# Patient Record
Sex: MALE | Race: BLACK OR AFRICAN AMERICAN | Employment: OTHER | URBAN - METROPOLITAN AREA
[De-identification: names, ages, dates, MRNs, and addresses within clinical notes are randomized per-mention and may not be internally consistent; named-entity substitution may affect disease eponyms.]

---

## 2017-03-16 ENCOUNTER — OFFICE VISIT (OUTPATIENT)
Dept: CARDIOLOGY CLINIC | Age: 82
End: 2017-03-16

## 2017-03-16 VITALS
DIASTOLIC BLOOD PRESSURE: 57 MMHG | BODY MASS INDEX: 27.55 KG/M2 | HEART RATE: 60 BPM | WEIGHT: 186 LBS | SYSTOLIC BLOOD PRESSURE: 103 MMHG | HEIGHT: 69 IN

## 2017-03-16 DIAGNOSIS — I25.119 ATHEROSCLEROSIS OF NATIVE CORONARY ARTERY OF NATIVE HEART WITH ANGINA PECTORIS (HCC): Primary | ICD-10-CM

## 2017-03-16 DIAGNOSIS — I10 ESSENTIAL HYPERTENSION, BENIGN: ICD-10-CM

## 2017-03-16 DIAGNOSIS — Z95.5 S/P CORONARY ARTERY STENT PLACEMENT: ICD-10-CM

## 2017-03-16 DIAGNOSIS — I50.42 CHRONIC COMBINED SYSTOLIC AND DIASTOLIC HEART FAILURE (HCC): ICD-10-CM

## 2017-03-16 NOTE — PROGRESS NOTES
1. Have you been to the ER, urgent care clinic since your last visit? Hospitalized since your last visit? No    2. Have you seen or consulted any other health care providers outside of the [address] [place] [address] since your last visit? Include any pap smears or colon screening. Yes Where: [facility]/PCP     3. Since your last visit, have you had any of the following symptoms? . Swellings leg/arms          4. Have you had any blood work, X-rays or cardiac testing? No              5.  Where do you normally have your labs drawn? [facility]    6. Do you need any refills today?    No

## 2017-03-16 NOTE — LETTER
Giselle Lupe 9/4/1927 
 
3/16/2017 Dear Micheal Merino NP I had the pleasure of evaluating  Mr. Emily Reardon in office today. Below are the relevant portions of my assessment and plan of care. ICD-10-CM ICD-9-CM 1. Atherosclerosis of native coronary artery of native heart with angina pectoris (HCC) I25.119 414.01   
  413.9   
 stable 
chest pains better 2. Chronic combined systolic and diastolic heart failure (HCC) I50.42 428.42   
 stable 
limited activity-has dependent edema 3. Essential hypertension, benign I10 401.1   
 controlled 4. S/P coronary artery stent placement Z95.5 V45.82 Current Outpatient Prescriptions Medication Sig Dispense Refill  AMLODIPINE BESYLATE (NORVASC PO) Take 5 mg by mouth.  gabapentin (NEURONTIN) 300 mg capsule Take 300 mg by mouth daily.  ferrous gluconate 324 mg (38 mg iron) tablet Take  by mouth Daily (before breakfast).  cyanocobalamin (VITAMIN B12) 500 mcg tablet Take 500 mcg by mouth daily.  aspirin (ASPIRIN) 325 mg tablet Take 325 mg by mouth daily.  insulin glargine (LANTUS) 100 unit/mL injection by SubCUTAneous route nightly.  carvedilol (COREG) 12.5 mg tablet Take 1 Tab by mouth two (2) times daily (with meals). 60 Tab 6  tamsulosin (FLOMAX) 0.4 mg capsule Take 1 Cap by mouth daily (after dinner). 90 Cap 3  
 atorvastatin (LIPITOR) 80 mg tablet Take 1 Tab by mouth daily. 30 Tab 6  furosemide (LASIX) 40 mg tablet Take 1 Tab by mouth daily. (Patient taking differently: Take 40 mg by mouth two (2) times a day.) 30 Tab 6  
 lisinopril (PRINIVIL, ZESTRIL) 20 mg tablet Take  by mouth daily.  memantine (NAMENDA) 10 mg tablet Take  by mouth two (2) times a day. No orders of the defined types were placed in this encounter. If you have questions, please do not hesitate to call me. I look forward to following Mr. Emily Reardon along with you. Sincerely, Mendy Quijano MD

## 2017-03-16 NOTE — PROGRESS NOTES
HISTORY OF PRESENT ILLNESS  Keenan Kitchen is a 80 y.o. male. HPI Comments: Patient with cad,old pci,chf,,htn. On follow up patient denies any chest pains, palpitation or other significant symptoms. He has  sob and edema,no change      Cardiomyopathy   The history is provided by the patient. This is a chronic problem. The problem occurs constantly. The problem has not changed since onset. Associated symptoms include shortness of breath. Pertinent negatives include no chest pain, no abdominal pain and no headaches. CHF   The history is provided by the patient. This is a chronic problem. The problem occurs constantly. Associated symptoms include shortness of breath. Pertinent negatives include no chest pain, no abdominal pain and no headaches. The symptoms are aggravated by walking. Hypertension   The history is provided by the patient. This is a chronic problem. The problem occurs constantly. The problem has not changed since onset. Associated symptoms include shortness of breath. Pertinent negatives include no chest pain, no abdominal pain and no headaches. Review of Systems   Constitutional: Negative for chills and fever. HENT: Negative for nosebleeds. Eyes: Negative for blurred vision and double vision. Respiratory: Positive for shortness of breath. Negative for cough, hemoptysis, sputum production and wheezing. Cardiovascular: Positive for leg swelling. Negative for chest pain, palpitations, orthopnea, claudication and PND. Gastrointestinal: Negative for abdominal pain, heartburn, nausea and vomiting. Musculoskeletal: Negative for myalgias. Skin: Negative for rash. Neurological: Negative for dizziness, weakness and headaches. Endo/Heme/Allergies: Does not bruise/bleed easily.      Family History   Problem Relation Age of Onset    Stroke Father     Cancer Sister     Cancer Brother     Heart Attack Neg Hx     Heart Surgery Neg Hx        Past Medical History:   Diagnosis Date  Atrophic testicle     BPH (benign prostatic hyperplasia)     CAD (coronary artery disease)     Diabetes (HCC)     Hematuria, microscopic     Hypertension     Incontinence     Lower urinary tract symptoms (LUTS)     Myocardial infarction ([facility] [address]     rca pci/4/2013    Other and unspecified hyperlipidemia 2/3/2014    f/u per pmd     Prostatitis        Past Surgical History:   Procedure Laterality Date    HX HEART CATHETERIZATION      4/13/3 vessel cad    HX PTCA         Social History   Substance Use Topics    Smoking status: Former Smoker     Types: Cigarettes     Quit date: 3/3/1991    Smokeless tobacco: Never Used    Alcohol use No       Allergies   Allergen Reactions    Augmentin [Amoxicillin-Pot Clavulanate] Diarrhea and Other (comments)     nausea       Current Outpatient Prescriptions   Medication Sig    AMLODIPINE BESYLATE (NORVASC PO) Take 5 mg by mouth.  gabapentin (NEURONTIN) 300 mg capsule Take 300 mg by mouth daily.  ferrous gluconate 324 mg (38 mg iron) tablet Take  by mouth Daily (before breakfast).  cyanocobalamin (VITAMIN B12) 500 mcg tablet Take 500 mcg by mouth daily.  aspirin (ASPIRIN) 325 mg tablet Take 325 mg by mouth daily.  insulin glargine (LANTUS) 100 unit/mL injection by SubCUTAneous route nightly.  carvedilol (COREG) 12.5 mg tablet Take 1 Tab by mouth two (2) times daily (with meals).  tamsulosin (FLOMAX) 0.4 mg capsule Take 1 Cap by mouth daily (after dinner).  atorvastatin (LIPITOR) 80 mg tablet Take 1 Tab by mouth daily.  furosemide (LASIX) 40 mg tablet Take 1 Tab by mouth daily. (Patient taking differently: Take 40 mg by mouth two (2) times a day.)    lisinopril (PRINIVIL, ZESTRIL) 20 mg tablet Take  by mouth daily.  memantine (NAMENDA) 10 mg tablet Take  by mouth two (2) times a day. No current facility-administered medications for this visit.         Visit Vitals    /57    Pulse 60    Ht 5' 9\" (1.753 m)    Wt 84.4 kg (186 lb)    BMI 27.47 kg/m2         Physical Exam   Constitutional: He is oriented to person, place, and time. He appears well-developed and well-nourished. HENT:   Head: Normocephalic and atraumatic. Eyes: Conjunctivae are normal.   Neck: Neck supple. No JVD present. No tracheal deviation present. No thyromegaly present. Cardiovascular: Normal rate, regular rhythm and normal heart sounds. Exam reveals no gallop and no friction rub. No murmur heard. Pulmonary/Chest: Breath sounds normal. No respiratory distress. He has no wheezes. He has no rales. He exhibits no tenderness. Abdominal: Soft. There is no tenderness. Musculoskeletal: He exhibits no edema. Neurological: He is alert and oriented to person, place, and time. Skin: Skin is warm and dry. Psychiatric: He has a normal mood and affect. Mr. Tori Aparicio has a reminder for a \"due or due soon\" health maintenance. I have asked that he contact his primary care provider for follow-up on this health maintenance. ECHO:8/2013  NORMAL LEFT VENTRICULAR CAVITY SIZE AND WALL THICKNESS. MILDLY DECREASED LEFT VENTRICULAR SYSTOLIC FUNCTION WITH EJECTION FRACTION ESTIMATED AT 50%. MILD DIASTOLIC DYSFUNCTION. MILD CALCIFICATION OF THE MITRAL VALVE LEAFLETS WITH TRACE MITRAL REGURGITATION. MILDLY SCLEROTIC TRICUSPID AORTIC VALVE WITHOUT EVIDENCE OF STENOSIS. MILD TRICUSPID REGURGITATION. CATH:Findings/PostOp Diagnosis:4/2013  1. Left ventricular ejection fraction = 45%, no mitral   regurgitation. 2. Normal left ventricular end diastolic pressure = 5 mmHg. 3. Three (3) vessel Coronary Artery Disease:  4. Successful reperfusion of proximal and distal RCA: PTCA/15 mm   Integrity BMS to ostial/proximal RCA 90%/СЕРГЕЙ 2 to 0%/СЕРГЕЙ 3   flow, dilated 3.1 mm diameter and Successful PTCA/22 mm Integrity   BMS to distal RCA into RPLV conn segment 95%/СЕРГЕЙ 2 to 0%/СЕРГЕЙ 3   flow, taper dilated 2.5/2/36 mm diameter.     11/2016-stress test  GATED IMAGES SHOW:        - A LARGE SIZED AREA OF HYPOKINESIS IN THE APEX, APICAL ANTERIOR, BASAL ANTERIOR, APICAL SEPTAL   AND ANTEROSEPTAL SEGMENTS        - A MEDIUM SIZED AREA OF HYPOKINESIS IN THE INFEROSEPTAL AND BASAL INFERIOR SEGMENTS        - A MEDIUM SIZED AREA OF EQUIVOCAL WALL MOTION IN THE BASAL INFEROLATERAL AND   ANTEROLATERAL SEGMENTS   LVEF 36%    Assessment       ICD-10-CM ICD-9-CM    1. Atherosclerosis of native coronary artery of native heart with angina pectoris ([facility]) I25.119 414.01      413.9     stable  chest pains better   2. Chronic combined systolic and diastolic heart failure ([facility]) I50.42 428.42     stable  limited activity-has dependent edema   3. Essential hypertension, benign I10 401.1     controlled   4. S/P coronary artery stent placement Z95.5 V45.82          Medications Discontinued During This Encounter   Medication Reason    metFORMIN (GLUMETZA ER) 500 mg TG24 24 hour tablet Discontinued by Another Clinician       No orders of the defined types were placed in this encounter. Follow-up Disposition:  Return in about 6 months (around 9/16/2017).

## 2017-03-16 NOTE — MR AVS SNAPSHOT
Visit Information Date & Time Provider Department Dept. Phone Encounter #  
 3/16/2017 11:30 AM Philip James MD [facility] [phone] Follow-up Instructions Return in about 6 months (around 9/16/2017). Your Appointments 3/16/2017 11:30 AM  
Follow Up with Philip James MD  
[facility] ([facility]) Appt Note: 3 month follow up  
 [address] [place] [address]  
  
   
 [address] [facility] [address] Upcoming Health Maintenance Date Due HEMOGLOBIN A1C Q6M 9/4/1927 LIPID PANEL Q1 9/4/1927 FOOT EXAM Q1 9/4/1937 MICROALBUMIN Q1 9/4/1937 EYE EXAM RETINAL OR DILATED Q1 9/4/1937 DTaP/Tdap/Td series (1 - Tdap) 9/4/1948 ZOSTER VACCINE AGE 60> 9/4/1987 GLAUCOMA SCREENING Q2Y 9/4/1992 Pneumococcal 65+ Low/Medium Risk (1 of 2 - PCV13) 9/4/1992 MEDICARE YEARLY EXAM 9/4/1992 INFLUENZA AGE 9 TO ADULT 8/1/2016 Allergies as of 3/16/2017  Review Complete On: 3/16/2017 By: Philip James MD  
  
 Severity Noted Reaction Type Reactions Augmentin [Amoxicillin-pot Clavulanate] High 01/06/2015   Systemic Diarrhea, Other (comments)  
 nausea Current Immunizations  Never Reviewed No immunizations on file. Not reviewed this visit You Were Diagnosed With   
  
 Codes Comments Atherosclerosis of native coronary artery of native heart with angina pectoris ([facility] [address]    -  Primary ICD-10-CM: I25.119 ICD-9-CM: 414.01, 413.9 stable 
chest pains better Chronic combined systolic and diastolic heart failure (HCC)     ICD-10-CM: I50.42 
ICD-9-CM: 428.42 stable 
limited activity-has dependent edema Essential hypertension, benign     ICD-10-CM: I10 
ICD-9-CM: 401.1 controlled S/P coronary artery stent placement     ICD-10-CM: Z95.5 ICD-9-CM: V45.82 Vitals BP Pulse Height(growth percentile) Weight(growth percentile) BMI Smoking Status 103/57 60 5' 9\" (1.753 m) 186 lb (84.4 kg) 27.47 kg/m2 Former Smoker Vitals History BMI and BSA Data Body Mass Index Body Surface Area  
 27.47 kg/m 2 2.03 m 2 Preferred Pharmacy Pharmacy Name Phone [id number] [address] Your Updated Medication List  
  
   
This list is accurate as of: 3/16/17 11:28 AM.  Always use your most recent med list.  
  
  
  
  
 aspirin 325 mg tablet Commonly known as:  ASPIRIN Take 325 mg by mouth daily. atorvastatin 80 mg tablet Commonly known as:  LIPITOR Take 1 Tab by mouth daily. carvedilol 12.5 mg tablet Commonly known as:  Lassiter Servant Take 1 Tab by mouth two (2) times daily (with meals). cyanocobalamin 500 mcg tablet Commonly known as:  VITAMIN B12 Take 500 mcg by mouth daily. ferrous gluconate 324 mg (38 mg iron) tablet Take  by mouth Daily (before breakfast). furosemide 40 mg tablet Commonly known as:  LASIX Take 1 Tab by mouth daily. gabapentin 300 mg capsule Commonly known as:  NEURONTIN Take 300 mg by mouth daily. LANTUS 100 unit/mL injection Generic drug:  insulin glargine  
by SubCUTAneous route nightly. lisinopril 20 mg tablet Commonly known as:  Rene Shutters Take  by mouth daily. NAMENDA 10 mg tablet Generic drug:  memantine Take  by mouth two (2) times a day. NORVASC PO Take 5 mg by mouth. tamsulosin 0.4 mg capsule Commonly known as:  FLOMAX Take 1 Cap by mouth daily (after dinner). Follow-up Instructions Return in about 6 months (around 9/16/2017). Introducing [facility]! Mady Verdugo introduces [employer] patient portal. Now you can access parts of your medical record, email your doctor's office, and request medication refills online. 1. In your internet browser, go to [email or web address] 2. Click on the First Time User? Click Here link in the Sign In box. You will see the New Member Sign Up page. 3. Enter your [employer] Access Code exactly as it appears below. You will not need to use this code after youve completed the sign-up process. If you do not sign up before the expiration date, you must request a new code. · [employer] Access Code: [email or web address] Expires: 6/14/2017 10:58 AM 
 
4. Enter the last four digits of your Social Security Number (xxxx) and Date of Birth (mm/dd/yyyy) as indicated and click Submit. You will be taken to the next sign-up page. 5. Create a [employer] ID. This will be your [employer] login ID and cannot be changed, so think of one that is secure and easy to remember. 6. Create a [employer] password. You can change your password at any time. 7. Enter your Password Reset Question and Answer. This can be used at a later time if you forget your password. 8. Enter your e-mail address. You will receive e-mail notification when new information is available in [address] 9. Click Sign Up. You can now view and download portions of your medical record. 10. Click the Download Summary menu link to download a portable copy of your medical information. If you have questions, please visit the Frequently Asked Questions section of the [employer] website. Remember, [employer] is NOT to be used for urgent needs. For medical emergencies, dial 911. Now available from your iPhone and Android! Please provide this summary of care documentation to your next provider. Your primary care clinician is listed as Mark Arriola. If you have any questions after today's visit, please call [phone].

## 2017-10-24 ENCOUNTER — OFFICE VISIT (OUTPATIENT)
Dept: CARDIOLOGY CLINIC | Age: 82
End: 2017-10-24

## 2017-10-24 VITALS
WEIGHT: 197 LBS | SYSTOLIC BLOOD PRESSURE: 125 MMHG | HEIGHT: 69 IN | DIASTOLIC BLOOD PRESSURE: 62 MMHG | BODY MASS INDEX: 29.18 KG/M2 | HEART RATE: 66 BPM

## 2017-10-24 DIAGNOSIS — Z95.5 S/P CORONARY ARTERY STENT PLACEMENT: ICD-10-CM

## 2017-10-24 DIAGNOSIS — I10 ESSENTIAL HYPERTENSION: ICD-10-CM

## 2017-10-24 DIAGNOSIS — I50.42 CHRONIC COMBINED SYSTOLIC AND DIASTOLIC HEART FAILURE (HCC): ICD-10-CM

## 2017-10-24 DIAGNOSIS — I42.9 CARDIOMYOPATHY, UNSPECIFIED TYPE (HCC): ICD-10-CM

## 2017-10-24 DIAGNOSIS — I25.119 CORONARY ARTERY DISEASE INVOLVING NATIVE CORONARY ARTERY OF NATIVE HEART WITH ANGINA PECTORIS (HCC): Primary | ICD-10-CM

## 2017-10-24 RX ORDER — ALLOPURINOL 100 MG/1
TABLET ORAL 2 TIMES DAILY
COMMUNITY

## 2017-10-24 NOTE — PROGRESS NOTES
1. Have you been to the ER, urgent care clinic since your last visit? Hospitalized since your last visit?     no    2. Have you seen or consulted any other health care providers outside of the [address] [place] [address] since your last visit? Include any pap smears or colon screening. Yes Where: pcp     3. Since your last visit, have you had any of the following symptoms?  no

## 2017-10-24 NOTE — LETTER
Gregory Szymanski 9/4/1927 
 
10/24/2017 Dear Pepe Bishop, ARLET I had the pleasure of evaluating  Mr. Rufino Schilling in office today. Below are the relevant portions of my assessment and plan of care. ICD-10-CM ICD-9-CM 1. Coronary artery disease involving native coronary artery of native heart with angina pectoris (HCC) I25.119 414.01   
  413.9   
 stable 
exertional sob 2. Essential hypertension I10 401.9   
 controlled 3. Chronic combined systolic and diastolic heart failure (HCC) I50.42 428.42   
 stable 
limited activity 4. [facility] I42.9 425.4 5. S/P coronary artery stent placement Z95.5 V45.82   
 stable Current Outpatient Prescriptions Medication Sig Dispense Refill  allopurinol (ZYLOPRIM) 100 mg tablet Take  by mouth two (2) times a day.  AMLODIPINE BESYLATE (NORVASC PO) Take 5 mg by mouth.  gabapentin (NEURONTIN) 300 mg capsule Take 300 mg by mouth daily.  ferrous gluconate 324 mg (38 mg iron) tablet Take  by mouth Daily (before breakfast).  cyanocobalamin (VITAMIN B12) 500 mcg tablet Take 500 mcg by mouth daily.  aspirin (ASPIRIN) 325 mg tablet Take 325 mg by mouth daily.  insulin glargine (LANTUS) 100 unit/mL injection by SubCUTAneous route nightly.  carvedilol (COREG) 12.5 mg tablet Take 1 Tab by mouth two (2) times daily (with meals). 60 Tab 6  tamsulosin (FLOMAX) 0.4 mg capsule Take 1 Cap by mouth daily (after dinner). 90 Cap 3  
 atorvastatin (LIPITOR) 80 mg tablet Take 1 Tab by mouth daily. 30 Tab 6  furosemide (LASIX) 40 mg tablet Take 1 Tab by mouth daily. (Patient taking differently: Take 40 mg by mouth two (2) times a day.) 30 Tab 6  
 lisinopril (PRINIVIL, ZESTRIL) 20 mg tablet Take  by mouth daily.  memantine (NAMENDA) 10 mg tablet Take  by mouth two (2) times a day. Orders Placed This Encounter  allopurinol (ZYLOPRIM) 100 mg tablet Sig: Take  by mouth two (2) times a day. If you have questions, please do not hesitate to call me. I look forward to following Mr. Ashley Nixon along with you. Sincerely, Ambreen Arredondo MD

## 2017-10-24 NOTE — PROGRESS NOTES
HISTORY OF PRESENT ILLNESS  Mago Carmona is a 80 y.o. male. HPI Comments: Patient with cad,old pci,chf,,htn. On follow up patient denies any chest pains, palpitation or other significant symptoms. He has  sob and edema,no change      Cardiomyopathy   The history is provided by the patient. This is a chronic problem. The problem occurs constantly. The problem has not changed since onset. Associated symptoms include shortness of breath. Pertinent negatives include no chest pain, no abdominal pain and no headaches. CHF   The history is provided by the patient. This is a chronic problem. The problem occurs constantly. Associated symptoms include shortness of breath. Pertinent negatives include no chest pain, no abdominal pain and no headaches. The symptoms are aggravated by walking. Hypertension   The history is provided by the patient. This is a chronic problem. The problem occurs constantly. The problem has not changed since onset. Associated symptoms include shortness of breath. Pertinent negatives include no chest pain, no abdominal pain and no headaches. Review of Systems   Constitutional: Negative for chills and fever. HENT: Negative for nosebleeds. Eyes: Negative for blurred vision and double vision. Respiratory: Positive for shortness of breath. Negative for cough, hemoptysis, sputum production and wheezing. Cardiovascular: Positive for leg swelling. Negative for chest pain, palpitations, orthopnea, claudication and PND. Gastrointestinal: Negative for abdominal pain, heartburn, nausea and vomiting. Musculoskeletal: Negative for myalgias. Skin: Negative for rash. Neurological: Negative for dizziness, weakness and headaches. Endo/Heme/Allergies: Does not bruise/bleed easily.      Family History   Problem Relation Age of Onset    Stroke Father     Cancer Sister     Cancer Brother     Heart Attack Neg Hx     Heart Surgery Neg Hx        Past Medical History:   Diagnosis Date  Atrophic testicle     BPH (benign prostatic hyperplasia)     CAD (coronary artery disease)     Diabetes (HCC)     Hematuria, microscopic     Hypertension     Incontinence     Lower urinary tract symptoms (LUTS)     Myocardial infarction     rca pci/4/2013    Other and unspecified hyperlipidemia 2/3/2014    f/u per pmd     Prostatitis        Past Surgical History:   Procedure Laterality Date    HX HEART CATHETERIZATION      4/13/3 vessel cad    HX PTCA         Social History   Substance Use Topics    Smoking status: Former Smoker     Types: Cigarettes     Quit date: 3/3/1991    Smokeless tobacco: Never Used    Alcohol use No       Allergies   Allergen Reactions    Augmentin [Amoxicillin-Pot Clavulanate] Diarrhea and Other (comments)     nausea       Current Outpatient Prescriptions   Medication Sig    allopurinol (ZYLOPRIM) 100 mg tablet Take  by mouth two (2) times a day.  AMLODIPINE BESYLATE (NORVASC PO) Take 5 mg by mouth.  gabapentin (NEURONTIN) 300 mg capsule Take 300 mg by mouth daily.  ferrous gluconate 324 mg (38 mg iron) tablet Take  by mouth Daily (before breakfast).  cyanocobalamin (VITAMIN B12) 500 mcg tablet Take 500 mcg by mouth daily.  aspirin (ASPIRIN) 325 mg tablet Take 325 mg by mouth daily.  insulin glargine (LANTUS) 100 unit/mL injection by SubCUTAneous route nightly.  carvedilol (COREG) 12.5 mg tablet Take 1 Tab by mouth two (2) times daily (with meals).  tamsulosin (FLOMAX) 0.4 mg capsule Take 1 Cap by mouth daily (after dinner).  atorvastatin (LIPITOR) 80 mg tablet Take 1 Tab by mouth daily.  furosemide (LASIX) 40 mg tablet Take 1 Tab by mouth daily. (Patient taking differently: Take 40 mg by mouth two (2) times a day.)    lisinopril (PRINIVIL, ZESTRIL) 20 mg tablet Take  by mouth daily.  memantine (NAMENDA) 10 mg tablet Take  by mouth two (2) times a day. No current facility-administered medications for this visit.         Visit Vitals  /62    Pulse 66    Ht 5' 9\" (1.753 m)    Wt 89.4 kg (197 lb)    BMI 29.09 kg/m2         Physical Exam   Constitutional: He is oriented to person, place, and time. He appears well-developed and well-nourished. HENT:   Head: Normocephalic and atraumatic. Eyes: Conjunctivae are normal.   Neck: Neck supple. No JVD present. No tracheal deviation present. No thyromegaly present. Cardiovascular: Normal rate, regular rhythm and normal heart sounds. Exam reveals no gallop and no friction rub. No murmur heard. Pulmonary/Chest: Breath sounds normal. No respiratory distress. He has no wheezes. He has no rales. He exhibits no tenderness. Abdominal: Soft. There is no tenderness. Musculoskeletal: He exhibits no edema. Neurological: He is alert and oriented to person, place, and time. Skin: Skin is warm and dry. Psychiatric: He has a normal mood and affect. Mr. Vinod Torres has a reminder for a \"due or due soon\" health maintenance. I have asked that he contact his primary care provider for follow-up on this health maintenance. ECHO:8/2013  NORMAL LEFT VENTRICULAR CAVITY SIZE AND WALL THICKNESS. MILDLY DECREASED LEFT VENTRICULAR SYSTOLIC FUNCTION WITH EJECTION FRACTION ESTIMATED AT 50%. MILD DIASTOLIC DYSFUNCTION. MILD CALCIFICATION OF THE MITRAL VALVE LEAFLETS WITH TRACE MITRAL REGURGITATION. MILDLY SCLEROTIC TRICUSPID AORTIC VALVE WITHOUT EVIDENCE OF STENOSIS. MILD TRICUSPID REGURGITATION. CATH:Findings/PostOp Diagnosis:4/2013  1. Left ventricular ejection fraction = 45%, no mitral   regurgitation. 2. Normal left ventricular end diastolic pressure = 5 mmHg. 3. Three (3) vessel Coronary Artery Disease:  4.  Successful reperfusion of proximal and distal RCA: PTCA/15 mm   Integrity BMS to ostial/proximal RCA 90%/СЕРГЕЙ 2 to 0%/СЕРГЕЙ 3   flow, dilated 3.1 mm diameter and Successful PTCA/22 mm Integrity   BMS to distal RCA into RPLV conn segment 95%/СЕРГЕЙ 2 to 0%/СЕРГЕЙ 3   flow, taper dilated 2.5/2/36 mm diameter. 11/2016-stress test  GATED IMAGES SHOW:        - A LARGE SIZED AREA OF HYPOKINESIS IN THE APEX, APICAL ANTERIOR, BASAL ANTERIOR, APICAL SEPTAL   AND ANTEROSEPTAL SEGMENTS        - A MEDIUM SIZED AREA OF HYPOKINESIS IN THE INFEROSEPTAL AND BASAL INFERIOR SEGMENTS        - A MEDIUM SIZED AREA OF EQUIVOCAL WALL MOTION IN THE BASAL INFEROLATERAL AND   ANTEROLATERAL SEGMENTS   LVEF 36%    Assessment       ICD-10-CM ICD-9-CM    1. Coronary artery disease involving native coronary artery of native heart with angina pectoris ([facility] I25.119 414.01      413.9     stable  exertional sob   2. Essential hypertension I10 401.9     controlled   3. Chronic combined systolic and diastolic heart failure (HCC) I50.42 428.42     stable  limited activity   4. Cardiomyopathy, unspecified type ([address] I42.9 425.4    5. S/P coronary artery stent placement Z95.5 V45.82     stable         There are no discontinued medications. No orders of the defined types were placed in this encounter. Follow-up Disposition:  Return in about 6 months (around 4/24/2018).

## 2017-10-24 NOTE — MR AVS SNAPSHOT
Visit Information Date & Time Provider Department Dept. Phone Encounter #  
 10/24/2017  9:45 AM Madelyn Lopez MD [facility] [place] [id number] Follow-up Instructions Return in about 6 months (around 4/24/2018). Upcoming Health Maintenance Date Due HEMOGLOBIN A1C Q6M 9/4/1927 LIPID PANEL Q1 9/4/1927 FOOT EXAM Q1 9/4/1937 MICROALBUMIN Q1 9/4/1937 EYE EXAM RETINAL OR DILATED Q1 9/4/1937 DTaP/Tdap/Td series (1 - Tdap) 9/4/1948 ZOSTER VACCINE AGE 60> 7/4/1987 GLAUCOMA SCREENING Q2Y 9/4/1992 Pneumococcal 65+ Low/Medium Risk (1 of 2 - PCV13) 9/4/1992 MEDICARE YEARLY EXAM 9/4/1992 INFLUENZA AGE 9 TO ADULT 8/1/2017 Allergies as of 10/24/2017  Review Complete On: 10/24/2017 By: Madelyn Lopez MD  
  
 Severity Noted Reaction Type Reactions Augmentin [Amoxicillin-pot Clavulanate] High 01/06/2015   Systemic Diarrhea, Other (comments)  
 nausea Current Immunizations  Never Reviewed No immunizations on file. Not reviewed this visit You Were Diagnosed With   
  
 Codes Comments Coronary artery disease involving native coronary artery of native heart with angina pectoris ([facility]    -  Primary ICD-10-CM: I25.119 ICD-9-CM: 414.01, 413.9 stable 
exertional sob Essential hypertension     ICD-10-CM: I10 
ICD-9-CM: 401.9 controlled Chronic combined systolic and diastolic heart failure (HCC)     ICD-10-CM: I50.42 
ICD-9-CM: 428.42 stable 
limited activity Cardiomyopathy, unspecified type ([facility]     ICD-10-CM: I42.9 ICD-9-CM: 425.4 S/P coronary artery stent placement     ICD-10-CM: Z95.5 ICD-9-CM: V45.82 stable Vitals BP Pulse Height(growth percentile) Weight(growth percentile) BMI Smoking Status 125/62 66 5' 9\" (1.753 m) 197 lb (89.4 kg) 29.09 kg/m2 Former Smoker Vitals History BMI and BSA Data Body Mass Index Body Surface Area  
 29.09 kg/m 2 2.09 m 2 Preferred Pharmacy Pharmacy Name Phone [facility] #[id number] [address] Your Updated Medication List  
  
   
This list is accurate as of: 10/24/17  9:45 AM.  Always use your most recent med list.  
  
  
  
  
 allopurinol 100 mg tablet Commonly known as:  Bernett Haily Take  by mouth two (2) times a day. aspirin 325 mg tablet Commonly known as:  ASPIRIN Take 325 mg by mouth daily. atorvastatin 80 mg tablet Commonly known as:  LIPITOR Take 1 Tab by mouth daily. carvedilol 12.5 mg tablet Commonly known as:  Vini Liter Take 1 Tab by mouth two (2) times daily (with meals). cyanocobalamin 500 mcg tablet Commonly known as:  VITAMIN B12 Take 500 mcg by mouth daily. ferrous gluconate 324 mg (38 mg iron) tablet Take  by mouth Daily (before breakfast). furosemide 40 mg tablet Commonly known as:  LASIX Take 1 Tab by mouth daily. gabapentin 300 mg capsule Commonly known as:  NEURONTIN Take 300 mg by mouth daily. LANTUS 100 unit/mL injection Generic drug:  insulin glargine  
by SubCUTAneous route nightly. lisinopril 20 mg tablet Commonly known as:  Merilynn Trenton Take  by mouth daily. NAMENDA 10 mg tablet Generic drug:  memantine Take  by mouth two (2) times a day. NORVASC PO Take 5 mg by mouth. tamsulosin 0.4 mg capsule Commonly known as:  FLOMAX Take 1 Cap by mouth daily (after dinner). Follow-up Instructions Return in about 6 months (around 4/24/2018). Introducing [facility] HE[facility] SERVICES! Feliz Schilling introduces [employer] patient portal. Now you can access parts of your medical record, email your doctor's office, and request medication refills online. 1. In your internet browser, go to [email or web address] 2. Click on the First Time User? Click Here link in the Sign In box. You will see the New Member Sign Up page. 3. Enter your ContextPlane Access Code exactly as it appears below. You will not need to use this code after youve completed the sign-up process. If you do not sign up before the expiration date, you must request a new code. · ContextPlane Access Code: [email or web address] Expires: 1/22/2018  9:45 AM 
 
4. Enter the last four digits of your Social Security Number (xxxx) and Date of Birth (mm/dd/yyyy) as indicated and click Submit. You will be taken to the next sign-up page. 5. Create a ContextPlane ID. This will be your ContextPlane login ID and cannot be changed, so think of one that is secure and easy to remember. 6. Create a ContextPlane password. You can change your password at any time. 7. Enter your Password Reset Question and Answer. This can be used at a later time if you forget your password. 8. Enter your e-mail address. You will receive e-mail notification when new information is available in [address] 9. Click Sign Up. You can now view and download portions of your medical record. 10. Click the Download Summary menu link to download a portable copy of your medical information. If you have questions, please visit the Frequently Asked Questions section of the ContextPlane website. Remember, ContextPlane is NOT to be used for urgent needs. For medical emergencies, dial 911. Now available from your iPhone and Android! Please provide this summary of care documentation to your next provider. Your primary care clinician is listed as Antelmo Saunders. If you have any questions after today's visit, please call [phone].

## 2018-04-25 ENCOUNTER — OFFICE VISIT (OUTPATIENT)
Dept: CARDIOLOGY CLINIC | Age: 83
End: 2018-04-25

## 2018-04-25 VITALS
WEIGHT: 206 LBS | BODY MASS INDEX: 30.51 KG/M2 | HEIGHT: 69 IN | DIASTOLIC BLOOD PRESSURE: 74 MMHG | SYSTOLIC BLOOD PRESSURE: 153 MMHG | HEART RATE: 64 BPM

## 2018-04-25 DIAGNOSIS — E78.5 HYPERLIPIDEMIA, UNSPECIFIED HYPERLIPIDEMIA TYPE: ICD-10-CM

## 2018-04-25 DIAGNOSIS — Z95.5 S/P CORONARY ARTERY STENT PLACEMENT: ICD-10-CM

## 2018-04-25 DIAGNOSIS — I10 ESSENTIAL HYPERTENSION: ICD-10-CM

## 2018-04-25 DIAGNOSIS — I25.119 CORONARY ARTERY DISEASE INVOLVING NATIVE CORONARY ARTERY OF NATIVE HEART WITH ANGINA PECTORIS (HCC): Primary | ICD-10-CM

## 2018-04-25 DIAGNOSIS — I50.42 CHRONIC COMBINED SYSTOLIC AND DIASTOLIC HEART FAILURE (HCC): ICD-10-CM

## 2018-04-25 RX ORDER — PHENOL/SODIUM PHENOLATE
20 AEROSOL, SPRAY (ML) MUCOUS MEMBRANE DAILY
COMMUNITY

## 2018-04-25 RX ORDER — ACETAMINOPHEN 500 MG
TABLET ORAL
COMMUNITY

## 2018-04-25 NOTE — MR AVS SNAPSHOT
[address] [facility] 
 
 
 [address] [facility] 
[phone] Patient: Fiona Babb MRN: [id number] OZU:[id number] Visit Information Date & Time Provider Department Dept. Phone Encounter #  
 4/25/2018 10:15 AM Orin Benitez MD [facility] [place] [phone] Follow-up Instructions Return in about 6 months (around 10/25/2018). Your Appointments 5/10/2018  9:45 AM  
Any with Marisel Richardson MD  
[facility] Appt Note: Hosp F/U Cysto Per Dr. Weaver [facility] [address] [facility] [address] [place] [address]  
  
   
 [address] [facility] [address] [address] [address] Upcoming Health Maintenance Date Due HEMOGLOBIN A1C Q6M 9/4/1927 LIPID PANEL Q1 9/4/1927 FOOT EXAM Q1 9/4/1937 MICROALBUMIN Q1 9/4/1937 EYE EXAM RETINAL OR DILATED Q1 9/4/1937 DTaP/Tdap/Td series (1 - Tdap) 9/4/1948 ZOSTER VACCINE AGE 60> 7/4/1987 GLAUCOMA SCREENING Q2Y 9/4/1992 Pneumococcal 65+ Low/Medium Risk (1 of 2 - PCV13) 9/4/1992 Influenza Age 5 to Adult 8/1/2017 MEDICARE YEARLY EXAM 3/14/2018 Allergies as of 4/25/2018  Review Complete On: 4/25/2018 By: Orin Benitez MD  
  
 Severity Noted Reaction Type Reactions Augmentin [Amoxicillin-pot Clavulanate] High 01/06/2015   Systemic Diarrhea, Other (comments)  
 nausea Current Immunizations  Never Reviewed No immunizations on file. Not reviewed this visit You Were Diagnosed With   
  
 Codes Comments Coronary artery disease involving native coronary artery of native heart with angina pectoris ([facility]    -  Primary ICD-10-CM: I25.119 ICD-9-CM: 414.01, 413.9 stable Essential hypertension     ICD-10-CM: I10 
ICD-9-CM: 401.9 mildly elevated 
monitor Hyperlipidemia, unspecified hyperlipidemia type     ICD-10-CM: E78.5 ICD-9-CM: 272.4 on statin 
lab with pcp Chronic combined systolic and diastolic heart failure (HCC)     ICD-10-CM: I50.42 
ICD-9-CM: 428.42 stable 
limited activity S/P coronary artery stent placement     ICD-10-CM: Z95.5 ICD-9-CM: V45.82 Vitals BP Pulse Height(growth percentile) Weight(growth percentile) BMI Smoking Status 153/74 64 5' 9\" (1.753 m) 206 lb (93.4 kg) 30.42 kg/m2 Former Smoker Vitals History BMI and BSA Data Body Mass Index Body Surface Area  
 30.42 kg/m 2 2.13 m 2 Preferred Pharmacy Pharmacy Name Phone [id number] [address] Your Updated Medication List  
  
   
This list is accurate as of 4/25/18 10:19 AM.  Always use your most recent med list.  
  
  
  
  
 acetaminophen 500 mg tablet Commonly known as:  TYLENOL Take  by mouth every six (6) hours as needed for Pain. allopurinol 100 mg tablet Commonly known as:  Marny Ximena Take  by mouth two (2) times a day. aspirin 325 mg tablet Commonly known as:  ASPIRIN Take 325 mg by mouth daily. atorvastatin 80 mg tablet Commonly known as:  LIPITOR Take 1 Tab by mouth daily. carvedilol 12.5 mg tablet Commonly known as:  [place] Pennant Take 1 Tab by mouth two (2) times daily (with meals). cyanocobalamin 500 mcg tablet Commonly known as:  VITAMIN B12 Take 500 mcg by mouth daily. ferrous gluconate 324 mg (38 mg iron) tablet Take  by mouth Daily (before breakfast). furosemide 40 mg tablet Commonly known as:  LASIX Take 1 Tab by mouth daily. gabapentin 300 mg capsule Commonly known as:  NEURONTIN Take 300 mg by mouth daily. LANTUS U-100 INSULIN 100 unit/mL injection Generic drug:  insulin glargine  
by SubCUTAneous route nightly. NAMENDA 10 mg tablet Generic drug:  memantine Take  by mouth two (2) times a day. NORVASC PO Take 5 mg by mouth. Omeprazole delayed release 20 mg tablet Commonly known as:  PRILOSEC D/R Take 20 mg by mouth daily. Follow-up Instructions Return in about 6 months (around 10/25/2018). Introducing [facility] & [facility] SERVICES! [employer] introduces [employer] patient portal. Now you can access parts of your medical record, email your doctor's office, and request medication refills online. 1. In your internet browser, go to [email or web address] 2. Click on the First Time User? Click Here link in the Sign In box. You will see the New Member Sign Up page. 3. Enter your CloudBase3 Access Code exactly as it appears below. You will not need to use this code after youve completed the sign-up process. If you do not sign up before the expiration date, you must request a new code. · CloudBase3 Access Code: [email or web address] Expires: 7/24/2018  9:38 AM 
 
4. Enter the last four digits of your Social Security Number (xxxx) and Date of Birth (mm/dd/yyyy) as indicated and click Submit. You will be taken to the next sign-up page. 5. Create a CloudBase3 ID. This will be your CloudBase3 login ID and cannot be changed, so think of one that is secure and easy to remember. 6. Create a CloudBase3 password. You can change your password at any time. 7. Enter your Password Reset Question and Answer. This can be used at a later time if you forget your password. 8. Enter your e-mail address. You will receive e-mail notification when new information is available in [address] 9. Click Sign Up. You can now view and download portions of your medical record. 10. Click the Download Summary menu link to download a portable copy of your medical information. If you have questions, please visit the Frequently Asked Questions section of the CloudBase3 website. Remember, CloudBase3 is NOT to be used for urgent needs. For medical emergencies, dial 911. Now available from your iPhone and Android! Please provide this summary of care documentation to your next provider. Your primary care clinician is listed as Marva Pham. If you have any questions after today's visit, please call [phone].

## 2018-04-25 NOTE — PROGRESS NOTES
HISTORY OF PRESENT ILLNESS  Maury Charlton is a 80 y.o. male. HPI Comments: Patient with cad,old pci,chf,,htn. On follow up patient denies any chest pains, palpitation or other significant symptoms. He has  sob and edema,no change      Cardiomyopathy   The history is provided by the patient. This is a chronic problem. The problem occurs constantly. The problem has not changed since onset. Associated symptoms include shortness of breath. Pertinent negatives include no chest pain, no abdominal pain and no headaches. CHF   The history is provided by the patient. This is a chronic problem. The problem occurs constantly. Associated symptoms include shortness of breath. Pertinent negatives include no chest pain, no abdominal pain and no headaches. The symptoms are aggravated by walking. Hypertension   The history is provided by the patient. This is a chronic problem. The problem occurs constantly. The problem has not changed since onset. Associated symptoms include shortness of breath. Pertinent negatives include no chest pain, no abdominal pain and no headaches. Review of Systems   Constitutional: Negative for chills and fever. HENT: Negative for nosebleeds. Eyes: Negative for blurred vision and double vision. Respiratory: Positive for shortness of breath. Negative for cough, hemoptysis, sputum production and wheezing. Cardiovascular: Positive for leg swelling. Negative for chest pain, palpitations, orthopnea, claudication and PND. Gastrointestinal: Negative for abdominal pain, heartburn, nausea and vomiting. Musculoskeletal: Negative for myalgias. Skin: Negative for rash. Neurological: Negative for dizziness, weakness and headaches. Endo/Heme/Allergies: Does not bruise/bleed easily.      Family History   Problem Relation Age of Onset    Stroke Father     Cancer Sister     Cancer Brother     Heart Attack Neg Hx     Heart Surgery Neg Hx        Past Medical History:   Diagnosis Date  Atrophic testicle     BPH (benign prostatic hyperplasia)     CAD (coronary artery disease)     Diabetes (HCC)     Hematuria, microscopic     Hypertension     Incontinence     Lower urinary tract symptoms (LUTS)     Myocardial infarction ([facility] [address]     rca pci/4/2013    Other and unspecified hyperlipidemia 2/3/2014    f/u per pmd     Prostatitis        Past Surgical History:   Procedure Laterality Date    HX HEART CATHETERIZATION      4/13/3 vessel cad    HX PTCA         Social History   Substance Use Topics    Smoking status: Former Smoker     Types: Cigarettes     Quit date: 3/3/1991    Smokeless tobacco: Never Used    Alcohol use No       Allergies   Allergen Reactions    Augmentin [Amoxicillin-Pot Clavulanate] Diarrhea and Other (comments)     nausea       Current Outpatient Prescriptions   Medication Sig    Omeprazole delayed release (PRILOSEC D/R) 20 mg tablet Take 20 mg by mouth daily.  acetaminophen (TYLENOL) 500 mg tablet Take  by mouth every six (6) hours as needed for Pain.  allopurinol (ZYLOPRIM) 100 mg tablet Take  by mouth two (2) times a day.  AMLODIPINE BESYLATE (NORVASC PO) Take 5 mg by mouth.  gabapentin (NEURONTIN) 300 mg capsule Take 300 mg by mouth daily.  ferrous gluconate 324 mg (38 mg iron) tablet Take  by mouth Daily (before breakfast).  cyanocobalamin (VITAMIN B12) 500 mcg tablet Take 500 mcg by mouth daily.  aspirin (ASPIRIN) 325 mg tablet Take 325 mg by mouth daily.  insulin glargine (LANTUS) 100 unit/mL injection by SubCUTAneous route nightly.  carvedilol (COREG) 12.5 mg tablet Take 1 Tab by mouth two (2) times daily (with meals).  atorvastatin (LIPITOR) 80 mg tablet Take 1 Tab by mouth daily.  furosemide (LASIX) 40 mg tablet Take 1 Tab by mouth daily. (Patient taking differently: Take 40 mg by mouth. 1/2 tab every 2 days)    memantine (NAMENDA) 10 mg tablet Take  by mouth two (2) times a day.      No current facility-administered medications for this visit. Visit Vitals    /74    Pulse 64    Ht 5' 9\" (1.753 m)    Wt 93.4 kg (206 lb)    BMI 30.42 kg/m2         Physical Exam   Constitutional: He is oriented to person, place, and time. He appears well-developed and well-nourished. HENT:   Head: Normocephalic and atraumatic. Eyes: Conjunctivae are normal.   Neck: Neck supple. No JVD present. No tracheal deviation present. No thyromegaly present. Cardiovascular: Normal rate, regular rhythm and normal heart sounds. Exam reveals no gallop and no friction rub. No murmur heard. Pulmonary/Chest: Breath sounds normal. No respiratory distress. He has no wheezes. He has no rales. He exhibits no tenderness. Abdominal: Soft. There is no tenderness. Musculoskeletal: He exhibits no edema. Neurological: He is alert and oriented to person, place, and time. Skin: Skin is warm and dry. Psychiatric: He has a normal mood and affect. Mr. Yocasta Antunez has a reminder for a \"due or due soon\" health maintenance. I have asked that he contact his primary care provider for follow-up on this health maintenance. ECHO:8/2013  NORMAL LEFT VENTRICULAR CAVITY SIZE AND WALL THICKNESS. MILDLY DECREASED LEFT VENTRICULAR SYSTOLIC FUNCTION WITH EJECTION FRACTION ESTIMATED AT 50%. MILD DIASTOLIC DYSFUNCTION. MILD CALCIFICATION OF THE MITRAL VALVE LEAFLETS WITH TRACE MITRAL REGURGITATION. MILDLY SCLEROTIC TRICUSPID AORTIC VALVE WITHOUT EVIDENCE OF STENOSIS. MILD TRICUSPID REGURGITATION. CATH:Findings/PostOp Diagnosis:4/2013  1. Left ventricular ejection fraction = 45%, no mitral   regurgitation. 2. Normal left ventricular end diastolic pressure = 5 mmHg. 3. Three (3) vessel Coronary Artery Disease:  4.  Successful reperfusion of proximal and distal RCA: PTCA/15 mm   Integrity BMS to ostial/proximal RCA 90%/СЕРГЕЙ 2 to 0%/СЕРГЕЙ 3   flow, dilated 3.1 mm diameter and Successful PTCA/22 mm Integrity   BMS to distal RCA into RPLV conn segment 95%/СЕРГЕЙ 2 to 0%/СЕРГЕЙ 3   flow, taper dilated 2.5/2/36 mm diameter. 11/2016-stress test  GATED IMAGES SHOW:        - A LARGE SIZED AREA OF HYPOKINESIS IN THE APEX, APICAL ANTERIOR, BASAL ANTERIOR, APICAL SEPTAL   AND ANTEROSEPTAL SEGMENTS        - A MEDIUM SIZED AREA OF HYPOKINESIS IN THE INFEROSEPTAL AND BASAL INFERIOR SEGMENTS        - A MEDIUM SIZED AREA OF EQUIVOCAL WALL MOTION IN THE BASAL INFEROLATERAL AND   ANTEROLATERAL SEGMENTS   LVEF 36%  I Have personally reviewed recent relevant labs available and discussed with patient    Assessment       ICD-10-CM ICD-9-CM    1. Coronary artery disease involving native coronary artery of native heart with angina pectoris ([facility] [address] I25.119 414.01      413.9     stable   2. Essential hypertension I10 401.9     mildly elevated  monitor   3. Hyperlipidemia, unspecified hyperlipidemia type E78.5 272.4     on statin  lab with pcp   4. Chronic combined systolic and diastolic heart failure (HCC) I50.42 428.42     stable  limited activity   5. S/P coronary artery stent placement Z95.5 V45.82      4/2018  Off lisinopril due to ckd    Medications Discontinued During This Encounter   Medication Reason    tamsulosin (FLOMAX) 0.4 mg capsule Discontinued by Another Clinician    lisinopril (PRINIVIL, ZESTRIL) 20 mg tablet Discontinued by Another Clinician       No orders of the defined types were placed in this encounter. Follow-up Disposition:  Return in about 6 months (around 10/25/2018).

## 2018-04-25 NOTE — LETTER
[facility] 9/4/1927 4/25/2018 Dear Adelaida Kong NP I had the pleasure of evaluating  Mr. Jolie Guidry in office today. Below are the relevant portions of my assessment and plan of care. ICD-10-CM ICD-9-CM 1. Coronary artery disease involving native coronary artery of native heart with angina pectoris ([facility] [address] I25.119 414.01   
  413.9   
 stable 2. Essential hypertension I10 401.9   
 mildly elevated 
monitor 3. Hyperlipidemia, unspecified hyperlipidemia type E78.5 272.4   
 on statin 
lab with pcp 4. Chronic combined systolic and diastolic heart failure (HCC) I50.42 428.42   
 stable 
limited activity 5. S/P coronary artery stent placement Z95.5 V45.82 Current Outpatient Prescriptions Medication Sig Dispense Refill  Omeprazole delayed release (PRILOSEC D/R) 20 mg tablet Take 20 mg by mouth daily.  acetaminophen (TYLENOL) 500 mg tablet Take  by mouth every six (6) hours as needed for Pain.  allopurinol (ZYLOPRIM) 100 mg tablet Take  by mouth two (2) times a day.  AMLODIPINE BESYLATE (NORVASC PO) Take 5 mg by mouth.  gabapentin (NEURONTIN) 300 mg capsule Take 300 mg by mouth daily.  ferrous gluconate 324 mg (38 mg iron) tablet Take  by mouth Daily (before breakfast).  cyanocobalamin (VITAMIN B12) 500 mcg tablet Take 500 mcg by mouth daily.  aspirin (ASPIRIN) 325 mg tablet Take 325 mg by mouth daily.  insulin glargine (LANTUS) 100 unit/mL injection by SubCUTAneous route nightly.  carvedilol (COREG) 12.5 mg tablet Take 1 Tab by mouth two (2) times daily (with meals). 60 Tab 6  
 atorvastatin (LIPITOR) 80 mg tablet Take 1 Tab by mouth daily. 30 Tab 6  furosemide (LASIX) 40 mg tablet Take 1 Tab by mouth daily. (Patient taking differently: Take 40 mg by mouth. 1/2 tab every 2 days) 30 Tab 6  
 memantine (NAMENDA) 10 mg tablet Take  by mouth two (2) times a day. Orders Placed This Encounter  Omeprazole delayed release (PRILOSEC D/R) 20 mg tablet Sig: Take 20 mg by mouth daily.  acetaminophen (TYLENOL) 500 mg tablet Sig: Take  by mouth every six (6) hours as needed for Pain. If you have questions, please do not hesitate to call me. I look forward to following Mr. Haley Silva along with you. Sincerely, Carlie Huynh MD

## 2018-04-25 NOTE — PROGRESS NOTES
1. Have you been to the ER, urgent care clinic since your last visit? Hospitalized since your last visit? Yes [facility]    2. Have you seen or consulted any other health care providers outside of the [address] [place] [address] since your last visit? Include any pap smears or colon screening. Yes Where: pcp     3. Since your last visit, have you had any of the following symptoms? shortness of breath, dizziness and swelling in legs/arms.

## 2018-10-25 ENCOUNTER — OFFICE VISIT (OUTPATIENT)
Dept: CARDIOLOGY CLINIC | Age: 83
End: 2018-10-25

## 2018-10-25 VITALS
DIASTOLIC BLOOD PRESSURE: 58 MMHG | HEART RATE: 61 BPM | BODY MASS INDEX: 28.29 KG/M2 | HEIGHT: 69 IN | WEIGHT: 191 LBS | SYSTOLIC BLOOD PRESSURE: 123 MMHG

## 2018-10-25 DIAGNOSIS — I25.2 OLD MI (MYOCARDIAL INFARCTION): ICD-10-CM

## 2018-10-25 DIAGNOSIS — I10 ESSENTIAL HYPERTENSION: ICD-10-CM

## 2018-10-25 DIAGNOSIS — I25.119 CORONARY ARTERY DISEASE INVOLVING NATIVE CORONARY ARTERY OF NATIVE HEART WITH ANGINA PECTORIS (HCC): Primary | ICD-10-CM

## 2018-10-25 DIAGNOSIS — Z95.5 S/P CORONARY ARTERY STENT PLACEMENT: ICD-10-CM

## 2018-10-25 DIAGNOSIS — I42.9 CARDIOMYOPATHY, UNSPECIFIED TYPE (HCC): ICD-10-CM

## 2018-10-25 RX ORDER — GUAIFENESIN 100 MG/5ML
81 LIQUID (ML) ORAL DAILY
COMMUNITY

## 2018-10-25 RX ORDER — POLYETHYLENE GLYCOL 3350 17 G/17G
17 POWDER, FOR SOLUTION ORAL DAILY
COMMUNITY

## 2018-10-25 RX ORDER — SIMETHICONE 125 MG
125 CAPSULE ORAL
COMMUNITY

## 2018-10-25 RX ORDER — MELATONIN
DAILY
COMMUNITY
End: 2019-11-05

## 2018-10-25 NOTE — LETTER
[place] [facility] 9/4/1927 
 
10/25/2018 Dear Ashly Haas NP I had the pleasure of evaluating  Mr. Sanjay Trimble in office today. Below are the relevant portions of my assessment and plan of care. ICD-10-CM ICD-9-CM 1. Coronary artery disease involving native coronary artery of native heart with angina pectoris ([facility] [address] I25.119 414.01   
  413.9 Stable continue treatment 2. Essential hypertension I10 401.9 Stable 3. S/P coronary artery stent placement Z95.5 V45.82 Stable old BMS 4. [facility] I42.9 425.4 Stable 5. Old MI (myocardial infarction) I25.2 412 Stable Current Outpatient Medications Medication Sig Dispense Refill  cholecalciferol (VITAMIN D3) 1,000 unit tablet Take  by mouth daily.  polyethylene glycol (MIRALAX) 17 gram packet Take 17 g by mouth daily.  simethicone (GAS-X) 125 mg capsule Take 125 mg by mouth four (4) times daily as needed for Flatulence.  aspirin 81 mg chewable tablet Take 81 mg by mouth daily.  finasteride (PROSCAR) 5 mg tablet Take 1 Tab by mouth daily. 90 Tab 3  
 Omeprazole delayed release (PRILOSEC D/R) 20 mg tablet Take 20 mg by mouth daily.  acetaminophen (TYLENOL) 500 mg tablet Take  by mouth every six (6) hours as needed for Pain.  allopurinol (ZYLOPRIM) 100 mg tablet Take  by mouth two (2) times a day.  AMLODIPINE BESYLATE (NORVASC PO) Take 5 mg by mouth.  gabapentin (NEURONTIN) 300 mg capsule Take 300 mg by mouth daily.  cyanocobalamin (VITAMIN B12) 500 mcg tablet Take 500 mcg by mouth daily.  insulin glargine (LANTUS) 100 unit/mL injection by SubCUTAneous route nightly.  carvedilol (COREG) 12.5 mg tablet Take 1 Tab by mouth two (2) times daily (with meals). (Patient taking differently: Take 3.125 mg by mouth two (2) times daily (with meals). ) 60 Tab 6  
 atorvastatin (LIPITOR) 80 mg tablet Take 1 Tab by mouth daily. 30 Tab 6  furosemide (LASIX) 40 mg tablet Take 1 Tab by mouth daily. (Patient taking differently: Take 40 mg by mouth. 1/2 tab every 2 days) 30 Tab 6  
 memantine (NAMENDA) 10 mg tablet Take  by mouth two (2) times a day. Orders Placed This Encounter  cholecalciferol (VITAMIN D3) 1,000 unit tablet Sig: Take  by mouth daily.  polyethylene glycol (MIRALAX) 17 gram packet Sig: Take 17 g by mouth daily.  simethicone (GAS-X) 125 mg capsule Sig: Take 125 mg by mouth four (4) times daily as needed for Flatulence.  aspirin 81 mg chewable tablet Sig: Take 81 mg by mouth daily. If you have questions, please do not hesitate to call me. I look forward to following Mr. Natanael Gan along with you. Sincerely, Rosetta Howe MD

## 2018-10-25 NOTE — PROGRESS NOTES
HISTORY OF PRESENT ILLNESS Korin Mcdaniel is a 80 y.o. male. Patient with cad,old pci,chf,,htn. On follow up patient denies any chest pains, palpitation or other significant symptoms. He has  sob and edema,no change Cardiomyopathy The history is provided by the patient. This is a chronic problem. The problem occurs constantly. The problem has not changed since onset. Associated symptoms include shortness of breath. Pertinent negatives include no chest pain, no abdominal pain and no headaches. CHF The history is provided by the patient. This is a chronic problem. The problem occurs constantly. Associated symptoms include shortness of breath. Pertinent negatives include no chest pain, no abdominal pain and no headaches. The symptoms are aggravated by walking. Hypertension The history is provided by the patient. This is a chronic problem. The problem occurs constantly. The problem has not changed since onset. Associated symptoms include shortness of breath. Pertinent negatives include no chest pain, no abdominal pain and no headaches. Review of Systems Constitutional: Negative for chills and fever. HENT: Negative for nosebleeds. Eyes: Negative for blurred vision and double vision. Respiratory: Positive for shortness of breath. Negative for cough, hemoptysis, sputum production and wheezing. Cardiovascular: Positive for leg swelling. Negative for chest pain, palpitations, orthopnea, claudication and PND. Gastrointestinal: Negative for abdominal pain, heartburn, nausea and vomiting. Musculoskeletal: Negative for myalgias. Skin: Negative for rash. Neurological: Negative for dizziness, weakness and headaches. Endo/Heme/Allergies: Does not bruise/bleed easily. Family History Problem Relation Age of Onset  Stroke Father  Cancer Sister  Cancer Brother  Heart Attack Neg Hx   
 Heart Surgery Neg Hx Past Medical History:  
Diagnosis Date  Atrophic testicle  BPH (benign prostatic hyperplasia)  CAD (coronary artery disease)  Diabetes ([facility] [address]  Hematuria, microscopic  Hypertension  Incontinence  Lower urinary tract symptoms (LUTS)  Myocardial infarction ([facility] [address]   
 rca /2013  Other and unspecified hyperlipidemia 2/3/2014  
 f/u per pmd   
 Prostatitis Past Surgical History:  
Procedure Laterality Date  HX HEART CATHETERIZATION    
 4/13/3 vessel cad  HX PTCA Social History Tobacco Use  Smoking status: Former Smoker Types: Cigarettes Last attempt to quit: 3/3/1991 Years since quittin.6  Smokeless tobacco: Never Used Substance Use Topics  Alcohol use: No  
 
 
Allergies Allergen Reactions  Augmentin [Amoxicillin-Pot Clavulanate] Diarrhea and Other (comments)  
  nausea Current Outpatient Medications Medication Sig  cholecalciferol (VITAMIN D3) 1,000 unit tablet Take  by mouth daily.  polyethylene glycol (MIRALAX) 17 gram packet Take 17 g by mouth daily.  simethicone (GAS-X) 125 mg capsule Take 125 mg by mouth four (4) times daily as needed for Flatulence.  finasteride (PROSCAR) 5 mg tablet Take 1 Tab by mouth daily.  Omeprazole delayed release (PRILOSEC D/R) 20 mg tablet Take 20 mg by mouth daily.  acetaminophen (TYLENOL) 500 mg tablet Take  by mouth every six (6) hours as needed for Pain.  allopurinol (ZYLOPRIM) 100 mg tablet Take  by mouth two (2) times a day.  AMLODIPINE BESYLATE (NORVASC PO) Take 5 mg by mouth.  gabapentin (NEURONTIN) 300 mg capsule Take 300 mg by mouth daily.  cyanocobalamin (VITAMIN B12) 500 mcg tablet Take 500 mcg by mouth daily.  aspirin (ASPIRIN) 325 mg tablet Take 81 mg by mouth daily.  insulin glargine (LANTUS) 100 unit/mL injection by SubCUTAneous route nightly.   
 carvedilol (COREG) 12.5 mg tablet Take 1 Tab by mouth two (2) times daily (with meals). (Patient taking differently: Take 3.125 mg by mouth two (2) times daily (with meals). )  atorvastatin (LIPITOR) 80 mg tablet Take 1 Tab by mouth daily.  furosemide (LASIX) 40 mg tablet Take 1 Tab by mouth daily. (Patient taking differently: Take 40 mg by mouth. 1/2 tab every 2 days)  memantine (NAMENDA) 10 mg tablet Take  by mouth two (2) times a day.  ferrous gluconate 324 mg (38 mg iron) tablet Take  by mouth Daily (before breakfast). No current facility-administered medications for this visit. Visit Vitals /58 Pulse 61 Ht 5' 9\" (1.753 m) Wt 86.6 kg (191 lb) BMI 28.21 kg/m² Physical Exam  
Constitutional: He is oriented to person, place, and time. He appears well-developed and well-nourished. HENT:  
Head: Normocephalic and atraumatic. Eyes: Conjunctivae are normal.  
Neck: Neck supple. No JVD present. No tracheal deviation present. No thyromegaly present. Cardiovascular: Normal rate, regular rhythm and normal heart sounds. Exam reveals no gallop and no friction rub. No murmur heard. Pulmonary/Chest: Breath sounds normal. No respiratory distress. He has no wheezes. He has no rales. He exhibits no tenderness. Abdominal: Soft. There is no tenderness. Musculoskeletal: He exhibits no edema. Neurological: He is alert and oriented to person, place, and time. Skin: Skin is warm and dry. Psychiatric: He has a normal mood and affect. Mr. Chely Balderrama has a reminder for a \"due or due soon\" health maintenance. I have asked that he contact his primary care provider for follow-up on this health maintenance. Gordy Lorenzo NORMAL LEFT VENTRICULAR CAVITY SIZE AND WALL THICKNESS. MILDLY DECREASED LEFT VENTRICULAR SYSTOLIC FUNCTION WITH EJECTION FRACTION ESTIMATED AT 50%. MILD DIASTOLIC DYSFUNCTION. MILD CALCIFICATION OF THE MITRAL VALVE LEAFLETS WITH TRACE MITRAL REGURGITATION. MILDLY SCLEROTIC TRICUSPID AORTIC VALVE WITHOUT EVIDENCE OF STENOSIS. MILD TRICUSPID REGURGITATION. CATH:Findings/PostOp Diagnosis:4/2013 1. Left ventricular ejection fraction = 45%, no mitral  
regurgitation. 2. Normal left ventricular end diastolic pressure = 5 mmHg. 3. Three (3) vessel Coronary Artery Disease: 4. Successful reperfusion of proximal and distal RCA: PTCA/15 mm Integrity BMS to ostial/proximal RCA 90%/СЕРГЕЙ 2 to 0%/СЕРГЕЙ 3  
flow, dilated 3.1 mm diameter and Successful PTCA/22 mm Integrity BMS to distal RCA into RPLV conn segment 95%/СЕРГЕЙ 2 to 0%/СЕРГЕЙ 3  
flow, taper dilated 2.5/2/36 mm diameter. 11/2016-stress test 
GATED IMAGES SHOW: 
      - A LARGE SIZED AREA OF HYPOKINESIS IN THE APEX, APICAL ANTERIOR, BASAL ANTERIOR, APICAL SEPTAL 
 AND ANTEROSEPTAL SEGMENTS 
      - A MEDIUM SIZED AREA OF HYPOKINESIS IN THE INFEROSEPTAL AND BASAL INFERIOR SEGMENTS 
      - A MEDIUM SIZED AREA OF EQUIVOCAL WALL MOTION IN THE BASAL INFEROLATERAL AND ANTEROLATERAL SEGMENTS 
 LVEF 36% I Have personally reviewed recent relevant labs available and discussed with patient Lipid-3/2018 
ldl 44 Assessment ICD-10-CM ICD-9-CM 1. Coronary artery disease involving native coronary artery of native heart with angina pectoris ([address] I25.119 414.01   
  413.9 Stable continue treatment 2. Essential hypertension I10 401.9 Stable 3. S/P coronary artery stent placement Z95.5 V45.82 Stable old BMS 4. Cardiomyopathy, unspecified type ([address] I42.9 425.4 Stable 5. Old MI (myocardial infarction) I25.2 412 Stable 4/2018 Off lisinopril due to ckd 10/2018 Cardiac status stable. Dependent edema. Continue current therapy including aspirin. There are no discontinued medications. No orders of the defined types were placed in this encounter. Follow-up Disposition: 
Return in about 6 months (around 4/25/2019).

## 2019-05-14 ENCOUNTER — OFFICE VISIT (OUTPATIENT)
Dept: CARDIOLOGY CLINIC | Age: 84
End: 2019-05-14

## 2019-05-14 VITALS
HEART RATE: 67 BPM | BODY MASS INDEX: 28.58 KG/M2 | WEIGHT: 193 LBS | HEIGHT: 69 IN | DIASTOLIC BLOOD PRESSURE: 46 MMHG | SYSTOLIC BLOOD PRESSURE: 98 MMHG

## 2019-05-14 DIAGNOSIS — E78.5 HYPERLIPIDEMIA, UNSPECIFIED HYPERLIPIDEMIA TYPE: ICD-10-CM

## 2019-05-14 DIAGNOSIS — I25.119 CORONARY ARTERY DISEASE INVOLVING NATIVE CORONARY ARTERY OF NATIVE HEART WITH ANGINA PECTORIS (HCC): Primary | ICD-10-CM

## 2019-05-14 DIAGNOSIS — I10 ESSENTIAL HYPERTENSION: ICD-10-CM

## 2019-05-14 DIAGNOSIS — Z95.5 S/P CORONARY ARTERY STENT PLACEMENT: ICD-10-CM

## 2019-05-14 DIAGNOSIS — I25.2 OLD MI (MYOCARDIAL INFARCTION): ICD-10-CM

## 2019-05-14 DIAGNOSIS — I42.9 CARDIOMYOPATHY, UNSPECIFIED TYPE (HCC): ICD-10-CM

## 2019-05-14 DIAGNOSIS — I50.42 CHRONIC COMBINED SYSTOLIC AND DIASTOLIC HEART FAILURE (HCC): ICD-10-CM

## 2019-05-14 NOTE — PATIENT INSTRUCTIONS
[employer] Activation    Thank you for requesting access to [employer]. Please follow the instructions below to securely access and download your online medical record. [employer] allows you to send messages to your doctor, view your test results, renew your prescriptions, schedule appointments, and more. How Do I Sign Up? 1. In your internet browser, go to [email or web address]. 2. Click on the First Time User? Click Here link in the Sign In box. You will see the New Member Sign Up page. 3. Enter your [employer] Access Code exactly as it appears below. You will not need to use this code after youve completed the sign-up process. If you do not sign up before the expiration date, you must request a new code. [employer] Access Code: [email or web address]  Expires: 2019 10:43 AM (This is the date your [employer] access code will )    4. Enter the last four digits of your Social Security Number (xxxx) and Date of Birth (mm/dd/yyyy) as indicated and click Submit. You will be taken to the next sign-up page. 5. Create a [employer] ID. This will be your [employer] login ID and cannot be changed, so think of one that is secure and easy to remember. 6. Create a [employer] password. You can change your password at any time. 7. Enter your Password Reset Question and Answer. This can be used at a later time if you forget your password. 8. Enter your e-mail address. You will receive e-mail notification when new information is available in [address]. 9. Click Sign Up. You can now view and download portions of your medical record. 10. Click the Download Summary menu link to download a portable copy of your medical information. Additional Information    If you have questions, please visit the Frequently Asked Questions section of the [employer] website at [email or web address] Remember, [employer] is NOT to be used for urgent needs. For medical emergencies, dial 911.

## 2019-05-14 NOTE — PROGRESS NOTES
HISTORY OF PRESENT ILLNESS  Laurie Jacques is a 80 y.o. male. Patient with cad,old pci,chf,,htn. On follow up patient denies any chest pains, palpitation or other significant symptoms. He has  sob and edema,no change      Cardiomyopathy   The history is provided by the patient. This is a chronic problem. The problem occurs constantly. The problem has not changed since onset. Associated symptoms include shortness of breath. Pertinent negatives include no chest pain, no abdominal pain and no headaches. CHF   The history is provided by the patient. This is a chronic problem. The problem occurs constantly. Associated symptoms include shortness of breath. Pertinent negatives include no chest pain, no abdominal pain and no headaches. The symptoms are aggravated by walking. Hypertension   The history is provided by the patient. This is a chronic problem. The problem occurs constantly. The problem has not changed since onset. Associated symptoms include shortness of breath. Pertinent negatives include no chest pain, no abdominal pain and no headaches. Review of Systems   Constitutional: Negative for chills and fever. HENT: Negative for nosebleeds. Eyes: Negative for blurred vision and double vision. Respiratory: Positive for shortness of breath. Negative for cough, hemoptysis, sputum production and wheezing. Cardiovascular: Positive for leg swelling. Negative for chest pain, palpitations, orthopnea, claudication and PND. Gastrointestinal: Negative for abdominal pain, heartburn, nausea and vomiting. Musculoskeletal: Negative for myalgias. Skin: Negative for rash. Neurological: Negative for dizziness, weakness and headaches. Endo/Heme/Allergies: Does not bruise/bleed easily.      Family History   Problem Relation Age of Onset    Stroke Father     Cancer Sister     Cancer Brother     Heart Attack Neg Hx     Heart Surgery Neg Hx        Past Medical History:   Diagnosis Date    Atrophic testicle     BPH (benign prostatic hyperplasia)     CAD (coronary artery disease)     Diabetes (HCC)     Hematuria, microscopic     Hypertension     Incontinence     Lower urinary tract symptoms (LUTS)     Myocardial infarction ([facility] [address]     rca /2013    Other and unspecified hyperlipidemia 2/3/2014    f/u per pmd     Prostatitis        Past Surgical History:   Procedure Laterality Date    HX HEART CATHETERIZATION      4/13/3 vessel cad    HX PTCA         Social History     Tobacco Use    Smoking status: Former Smoker     Types: Cigarettes     Last attempt to quit: 3/3/1991     Years since quittin.2    Smokeless tobacco: Never Used   Substance Use Topics    Alcohol use: No       Allergies   Allergen Reactions    Augmentin [Amoxicillin-Pot Clavulanate] Diarrhea and Other (comments)     nausea       Current Outpatient Medications   Medication Sig    cholecalciferol (VITAMIN D3) 1,000 unit tablet Take  by mouth daily.  polyethylene glycol (MIRALAX) 17 gram packet Take 17 g by mouth daily.  simethicone (GAS-X) 125 mg capsule Take 125 mg by mouth four (4) times daily as needed for Flatulence.  aspirin 81 mg chewable tablet Take 81 mg by mouth daily.  finasteride (PROSCAR) 5 mg tablet Take 1 Tab by mouth daily.  Omeprazole delayed release (PRILOSEC D/R) 20 mg tablet Take 20 mg by mouth daily.  acetaminophen (TYLENOL) 500 mg tablet Take  by mouth every six (6) hours as needed for Pain.  allopurinol (ZYLOPRIM) 100 mg tablet Take  by mouth two (2) times a day.  AMLODIPINE BESYLATE (NORVASC PO) Take 5 mg by mouth.  gabapentin (NEURONTIN) 300 mg capsule Take 300 mg by mouth daily.  cyanocobalamin (VITAMIN B12) 500 mcg tablet Take 500 mcg by mouth daily.  insulin glargine (LANTUS) 100 unit/mL injection by SubCUTAneous route nightly.  carvedilol (COREG) 12.5 mg tablet Take 1 Tab by mouth two (2) times daily (with meals).  (Patient taking differently: Take 3.125 mg by mouth two (2) times daily (with meals). )    atorvastatin (LIPITOR) 80 mg tablet Take 1 Tab by mouth daily.  furosemide (LASIX) 40 mg tablet Take 1 Tab by mouth daily. (Patient taking differently: Take 40 mg by mouth. 1/2 tab every 2 days)    memantine (NAMENDA) 10 mg tablet Take  by mouth two (2) times a day. No current facility-administered medications for this visit. Visit Vitals  BP 98/46   Pulse 67   Ht 5' 9\" (1.753 m)   Wt 87.5 kg (193 lb)   BMI 28.50 kg/m²         Physical Exam   Constitutional: He is oriented to person, place, and time. He appears well-developed and well-nourished. HENT:   Head: Normocephalic and atraumatic. Eyes: Conjunctivae are normal.   Neck: Neck supple. No JVD present. No tracheal deviation present. No thyromegaly present. Cardiovascular: Normal rate, regular rhythm and normal heart sounds. Exam reveals no gallop and no friction rub. No murmur heard. Pulmonary/Chest: Breath sounds normal. No respiratory distress. He has no wheezes. He has no rales. He exhibits no tenderness. Abdominal: Soft. There is no tenderness. Musculoskeletal: He exhibits no edema. Neurological: He is alert and oriented to person, place, and time. Skin: Skin is warm and dry. Psychiatric: He has a normal mood and affect. Mr. León Coleman has a reminder for a \"due or due soon\" health maintenance. I have asked that he contact his primary care provider for follow-up on this health maintenance. ECHO:8/2013  NORMAL LEFT VENTRICULAR CAVITY SIZE AND WALL THICKNESS. MILDLY DECREASED LEFT VENTRICULAR SYSTOLIC FUNCTION WITH EJECTION FRACTION ESTIMATED AT 50%. MILD DIASTOLIC DYSFUNCTION. MILD CALCIFICATION OF THE MITRAL VALVE LEAFLETS WITH TRACE MITRAL REGURGITATION. MILDLY SCLEROTIC TRICUSPID AORTIC VALVE WITHOUT EVIDENCE OF STENOSIS. MILD TRICUSPID REGURGITATION. CATH:Findings/PostOp Diagnosis:4/2013  1. Left ventricular ejection fraction = 45%, no mitral   regurgitation.   2. Normal left ventricular end diastolic pressure = 5 mmHg. 3. Three (3) vessel Coronary Artery Disease:  4. Successful reperfusion of proximal and distal RCA: PTCA/15 mm   Integrity BMS to ostial/proximal RCA 90%/СЕРГЕЙ 2 to 0%/СЕРГЕЙ 3   flow, dilated 3.1 mm diameter and Successful PTCA/22 mm Integrity   BMS to distal RCA into RPLV conn segment 95%/СЕРГЕЙ 2 to 0%/СЕРГЕЙ 3   flow, taper dilated 2.5/2/36 mm diameter. 11/2016-stress test  GATED IMAGES SHOW:        - A LARGE SIZED AREA OF HYPOKINESIS IN THE APEX, APICAL ANTERIOR, BASAL ANTERIOR, APICAL SEPTAL   AND ANTEROSEPTAL SEGMENTS        - A MEDIUM SIZED AREA OF HYPOKINESIS IN THE INFEROSEPTAL AND BASAL INFERIOR SEGMENTS        - A MEDIUM SIZED AREA OF EQUIVOCAL WALL MOTION IN THE BASAL INFEROLATERAL AND   ANTEROLATERAL SEGMENTS   LVEF 36%  I Have personally reviewed recent relevant labs available and discussed with patient  Lipid-3/2018  ldl 44  4/2019  Assessment       ICD-10-CM ICD-9-CM    1. Coronary artery disease involving native coronary artery of native heart with angina pectoris ([address] I25.119 414.01      413.9     Stable continue current treatment   2. S/P coronary artery stent placement Z95.5 V45.82    3. Cardiomyopathy, unspecified type ([address] I42.9 425.4     Continue current therapy. Compensated   4. Old MI (myocardial infarction) I25.2 412    5. Hyperlipidemia, unspecified hyperlipidemia type E78.5 272.4     On treatment lab with PCP   6. Chronic combined systolic and diastolic heart failure (HCC) I50.42 428.42     Stable compensated class II continue current therapy   7. Essential hypertension I10 401.9     Stable     4/2018  Off lisinopril due to ckd  10/2018  Cardiac status stable. Dependent edema. Continue current therapy including aspirin. There are no discontinued medications. No orders of the defined types were placed in this encounter. Follow-up and Dispositions    · Return in about 6 months (around 11/14/2019).

## 2019-05-14 NOTE — PROGRESS NOTES
1. Have you been to the ER, urgent care clinic since your last visit? Hospitalized since your last visit? No     2. Have you seen or consulted any other health care providers outside of the [address] [place] [address] since your last visit? Include any pap smears or colon screening. Yes Where: PCP     3. Since your last visit, have you had any of the following symptoms? .         4. Have you had any blood work, X-rays or cardiac testing? 5. Where do you normally have your labs drawn? 6. Do you need any refills today?

## 2019-05-15 PROBLEM — N32.89 BLADDER MASS: Status: ACTIVE | Noted: 2018-04-01

## 2019-05-15 PROBLEM — I67.82 TEMPORARY CEREBRAL VASCULAR DYSFUNCTION: Status: ACTIVE | Noted: 2018-03-27

## 2019-05-15 PROBLEM — E55.9 VITAMIN D DEFICIENCY: Status: ACTIVE | Noted: 2018-04-01

## 2019-05-15 PROBLEM — N17.9 ACUTE KIDNEY INJURY (HCC): Status: ACTIVE | Noted: 2018-03-30

## 2019-05-15 PROBLEM — N18.4 CHRONIC RENAL INSUFFICIENCY, STAGE 4 (SEVERE) (HCC): Status: ACTIVE | Noted: 2018-03-22

## 2019-05-15 PROBLEM — I77.9 LEFT-SIDED CAROTID ARTERY DISEASE (HCC): Status: ACTIVE | Noted: 2018-04-13

## 2019-05-15 PROBLEM — R00.1 BRADYCARDIA: Status: ACTIVE | Noted: 2018-03-27

## 2019-05-15 PROBLEM — R10.9 ABDOMINAL PAIN: Status: ACTIVE | Noted: 2018-03-28

## 2019-05-15 PROBLEM — K59.00 CONSTIPATION: Status: ACTIVE | Noted: 2018-03-28

## 2019-11-05 ENCOUNTER — OFFICE VISIT (OUTPATIENT)
Dept: CARDIOLOGY CLINIC | Age: 84
End: 2019-11-05

## 2019-11-05 VITALS
HEART RATE: 62 BPM | SYSTOLIC BLOOD PRESSURE: 105 MMHG | BODY MASS INDEX: 28.14 KG/M2 | DIASTOLIC BLOOD PRESSURE: 54 MMHG | HEIGHT: 69 IN | WEIGHT: 190 LBS

## 2019-11-05 DIAGNOSIS — I42.9 CARDIOMYOPATHY, UNSPECIFIED TYPE (HCC): ICD-10-CM

## 2019-11-05 DIAGNOSIS — E78.5 HYPERLIPIDEMIA, UNSPECIFIED HYPERLIPIDEMIA TYPE: ICD-10-CM

## 2019-11-05 DIAGNOSIS — I50.42 CHRONIC COMBINED SYSTOLIC AND DIASTOLIC HEART FAILURE (HCC): ICD-10-CM

## 2019-11-05 DIAGNOSIS — Z95.5 S/P CORONARY ARTERY STENT PLACEMENT: ICD-10-CM

## 2019-11-05 DIAGNOSIS — I25.119 CORONARY ARTERY DISEASE INVOLVING NATIVE CORONARY ARTERY OF NATIVE HEART WITH ANGINA PECTORIS (HCC): Primary | ICD-10-CM

## 2019-11-05 DIAGNOSIS — I10 ESSENTIAL HYPERTENSION: ICD-10-CM

## 2019-11-05 DIAGNOSIS — I10 ESSENTIAL HYPERTENSION: Primary | ICD-10-CM

## 2019-11-05 DIAGNOSIS — I25.2 OLD MI (MYOCARDIAL INFARCTION): ICD-10-CM

## 2019-11-05 RX ORDER — AMLODIPINE BESYLATE 5 MG/1
5 TABLET ORAL DAILY
Qty: 90 TAB | Refills: 2 | Status: SHIPPED | OUTPATIENT
Start: 2019-11-05 | End: 2020-12-10

## 2019-11-05 RX ORDER — AMLODIPINE BESYLATE 2.5 MG/1
5 TABLET ORAL DAILY
Qty: 90 TAB | Refills: 2 | Status: SHIPPED | OUTPATIENT
Start: 2019-11-05 | End: 2019-11-05 | Stop reason: SDUPTHER

## 2019-11-05 NOTE — PROGRESS NOTES
HISTORY OF PRESENT ILLNESS  Elizabeth Pineda is a 80 y.o. male. Patient with cad,old pci,chf,,htn. On follow up patient denies any chest pains, palpitation or other significant symptoms. He has  sob and edema,no change      Cardiomyopathy   The history is provided by the patient. This is a chronic problem. The problem occurs constantly. The problem has not changed since onset. Associated symptoms include shortness of breath. Pertinent negatives include no chest pain, no abdominal pain and no headaches. CHF   The history is provided by the patient. This is a chronic problem. The problem occurs constantly. Associated symptoms include shortness of breath. Pertinent negatives include no chest pain, no abdominal pain and no headaches. The symptoms are aggravated by walking. Hypertension   The history is provided by the patient. This is a chronic problem. The problem occurs constantly. The problem has not changed since onset. Associated symptoms include shortness of breath. Pertinent negatives include no chest pain, no abdominal pain and no headaches. Review of Systems   Constitutional: Negative for chills and fever. HENT: Negative for nosebleeds. Eyes: Negative for blurred vision and double vision. Respiratory: Positive for shortness of breath. Negative for cough, hemoptysis, sputum production and wheezing. Cardiovascular: Positive for leg swelling. Negative for chest pain, palpitations, orthopnea, claudication and PND. Gastrointestinal: Negative for abdominal pain, heartburn, nausea and vomiting. Musculoskeletal: Negative for myalgias. Skin: Negative for rash. Neurological: Negative for dizziness, weakness and headaches. Endo/Heme/Allergies: Does not bruise/bleed easily.      Family History   Problem Relation Age of Onset    Stroke Father     Cancer Sister     Cancer Brother     Heart Attack Neg Hx     Heart Surgery Neg Hx        Past Medical History:   Diagnosis Date    Atrophic testicle     BPH (benign prostatic hyperplasia)     CAD (coronary artery disease)     Diabetes (HCC)     Hematuria, microscopic     Hypertension     Incontinence     Lower urinary tract symptoms (LUTS)     Myocardial infarction ([facility] [address]     rca /2013    Other and unspecified hyperlipidemia 2/3/2014    f/u per pmd     Prostatitis        Past Surgical History:   Procedure Laterality Date    HX HEART CATHETERIZATION      4/13/3 vessel cad    HX PTCA         Social History     Tobacco Use    Smoking status: Former Smoker     Types: Cigarettes     Last attempt to quit: 3/3/1991     Years since quittin.6    Smokeless tobacco: Never Used   Substance Use Topics    Alcohol use: No       Allergies   Allergen Reactions    Augmentin [Amoxicillin-Pot Clavulanate] Diarrhea and Other (comments)     Pt denies allergy       Current Outpatient Medications   Medication Sig    amLODIPine (NORVASC) 2.5 mg tablet Take 2 Tabs by mouth daily.  Blood-Glucose Meter misc 1 Each by SubCUTAneous route.  ACCU-CHEK SMARTVIEW TEST STRIP strip INJECT BENEATH THE SKIN AND USE 1 STRIP AS DIRECTED DAILY    glucose blood VI test strips (ASCENSIA AUTODISC VI, ONE TOUCH ULTRA TEST VI) strip CHECK BLOOD SUGAR ONCE DAILY and prn    Insulin Needles, Disposable, (GAGE PEN NEEDLE) 32 gauge x \" ndle USE 1 NEEDLE BENEATH THE SKIN THREE TIMES A DAY WITH MEALS    E11    finasteride (PROSCAR) 5 mg tablet Take 1 Tab by mouth daily.  polyethylene glycol (MIRALAX) 17 gram packet Take 17 g by mouth daily.  simethicone (GAS-X) 125 mg capsule Take 125 mg by mouth four (4) times daily as needed for Flatulence.  aspirin 81 mg chewable tablet Take 81 mg by mouth daily.  Omeprazole delayed release (PRILOSEC D/R) 20 mg tablet Take 20 mg by mouth daily.  acetaminophen (TYLENOL) 500 mg tablet Take  by mouth every six (6) hours as needed for Pain.  allopurinol (ZYLOPRIM) 100 mg tablet Take  by mouth two (2) times a day.     gabapentin (NEURONTIN) 300 mg capsule Take 300 mg by mouth daily.  insulin glargine (LANTUS) 100 unit/mL injection by SubCUTAneous route nightly.  carvedilol (COREG) 12.5 mg tablet Take 1 Tab by mouth two (2) times daily (with meals). (Patient taking differently: Take 3.125 mg by mouth two (2) times daily (with meals). )    atorvastatin (LIPITOR) 80 mg tablet Take 1 Tab by mouth daily.  furosemide (LASIX) 40 mg tablet Take 1 Tab by mouth daily. (Patient taking differently: Take 40 mg by mouth. 1/2 tab every 2 days)    memantine (NAMENDA) 10 mg tablet Take  by mouth two (2) times a day. No current facility-administered medications for this visit. Visit Vitals  /54   Pulse 62   Ht 5' 9\" (1.753 m)   Wt 86.2 kg (190 lb)   BMI 28.06 kg/m²         Physical Exam   Constitutional: He is oriented to person, place, and time. He appears well-developed and well-nourished. HENT:   Head: Normocephalic and atraumatic. Eyes: Conjunctivae are normal.   Neck: Neck supple. No JVD present. No tracheal deviation present. No thyromegaly present. Cardiovascular: Normal rate, regular rhythm and normal heart sounds. Exam reveals no gallop and no friction rub. No murmur heard. Pulmonary/Chest: Breath sounds normal. No respiratory distress. He has no wheezes. He has no rales. He exhibits no tenderness. Abdominal: Soft. There is no tenderness. Musculoskeletal: He exhibits edema. Neurological: He is alert and oriented to person, place, and time. Skin: Skin is warm and dry. Psychiatric: He has a normal mood and affect. Mr. Rosalee Ibarra has a reminder for a \"due or due soon\" health maintenance. I have asked that he contact his primary care provider for follow-up on this health maintenance. ECHO:8/2013  NORMAL LEFT VENTRICULAR CAVITY SIZE AND WALL THICKNESS. MILDLY DECREASED LEFT VENTRICULAR SYSTOLIC FUNCTION WITH EJECTION FRACTION ESTIMATED AT 50%. MILD DIASTOLIC DYSFUNCTION.   MILD CALCIFICATION OF THE MITRAL VALVE LEAFLETS WITH TRACE MITRAL REGURGITATION. MILDLY SCLEROTIC TRICUSPID AORTIC VALVE WITHOUT EVIDENCE OF STENOSIS. MILD TRICUSPID REGURGITATION. CATH:Findings/PostOp Diagnosis:4/2013  1. Left ventricular ejection fraction = 45%, no mitral   regurgitation. 2. Normal left ventricular end diastolic pressure = 5 mmHg. 3. Three (3) vessel Coronary Artery Disease:  4. Successful reperfusion of proximal and distal RCA: PTCA/15 mm   Integrity BMS to ostial/proximal RCA 90%/СЕРГЕЙ 2 to 0%/СЕРГЕЙ 3   flow, dilated 3.1 mm diameter and Successful PTCA/22 mm Integrity   BMS to distal RCA into RPLV conn segment 95%/СЕРГЕЙ 2 to 0%/СЕРГЕЙ 3   flow, taper dilated 2.5/2/36 mm diameter. 11/2016-stress test  GATED IMAGES SHOW:        - A LARGE SIZED AREA OF HYPOKINESIS IN THE APEX, APICAL ANTERIOR, BASAL ANTERIOR, APICAL SEPTAL   AND ANTEROSEPTAL SEGMENTS        - A MEDIUM SIZED AREA OF HYPOKINESIS IN THE INFEROSEPTAL AND BASAL INFERIOR SEGMENTS        - A MEDIUM SIZED AREA OF EQUIVOCAL WALL MOTION IN THE BASAL INFEROLATERAL AND   ANTEROLATERAL SEGMENTS   LVEF 36%  I Have personally reviewed recent relevant labs available and discussed with patient  Lipid-3/2018  ldl 44  4/2019    Assessment       ICD-10-CM ICD-9-CM    1. Coronary artery disease involving native coronary artery of native heart with angina pectoris ([facility] [address] I25.119 414.01      413.9      Stable continue current treatment       2. S/P coronary artery stent placement Z95.5 V45.82     Stable continue current treatment   3. Cardiomyopathy, unspecified type ([address] I42.9 425.4     Continue current therapy. Compensated   4. Old MI (myocardial infarction) I25.2 412    5. Hyperlipidemia, unspecified hyperlipidemia type E78.5 272.4     On treatment lab with PCP   6. Chronic combined systolic and diastolic heart failure (HCC) I50.42 428.42     Stable compensated class II continue current therapy   7.  Essential hypertension I10 401.9     Stable     4/2018  Off lisinopril due to ckd  10/2018  Cardiac status stable. Dependent edema. Continue current therapy including aspirin. 11/2019  Cardiac status stable. B/P borderline with dependent edema. Will decrease Amlodipine to 2.5 mg / day. May d/c if b/p continues to be low. Advised to elevate feet when able. Medications Discontinued During This Encounter   Medication Reason    cholecalciferol (VITAMIN D3) 1,000 unit tablet     cyanocobalamin (VITAMIN B12) 500 mcg tablet     ergocalciferol (ERGOCALCIFEROL) 50,000 unit capsule     AMLODIPINE BESYLATE (NORVASC PO)         Orders Placed This Encounter    amLODIPine (NORVASC) 2.5 mg tablet     Sig: Take 2 Tabs by mouth daily. Dispense:  90 Tab     Refill:  2       Follow-up and Dispositions    · Return in about 6 months (around 5/5/2020), or if symptoms worsen or fail to improve. I have independently evaluated taken history and examined the patient. All relevant labs and testing data's are reviewed. Care plan discussed and updated after review.   Rodrigo Barragan MD

## 2019-11-05 NOTE — PROGRESS NOTES
1. Have you been to the ER, urgent care clinic since your last visit? Hospitalized since your last visit? No    2. Have you seen or consulted any other health care providers outside of the [address] [place] [address] since your last visit? Include any pap smears or colon screening.  No

## 2019-11-05 NOTE — PATIENT INSTRUCTIONS
Decrease Amlodipine to 2.5 mg/ day     Heart-Healthy Diet: Care Instructions  Your Care Instructions    A heart-healthy diet has lots of vegetables, fruits, nuts, beans, and whole grains, and is low in salt. It limits foods that are high in saturated fat, such as meats, cheeses, and fried foods. It may be hard to change your diet, but even small changes can lower your risk of heart attack and heart disease. Follow-up care is a key part of your treatment and safety. Be sure to make and go to all appointments, and call your doctor if you are having problems. It's also a good idea to know your test results and keep a list of the medicines you take. How can you care for yourself at home? Watch your portions  · Learn what a serving is. A \"serving\" and a \"portion\" are not always the same thing. Make sure that you are not eating larger portions than are recommended. For example, a serving of pasta is ½ cup. A serving size of meat is 2 to 3 ounces. A 3-ounce serving is about the size of a deck of cards. Measure serving sizes until you are good at [place]" them. Keep in mind that restaurants often serve portions that are 2 or 3 times the size of one serving. · To keep your energy level up and keep you from feeling hungry, eat often but in smaller portions. · Eat only the number of calories you need to stay at a healthy weight. If you need to lose weight, eat fewer calories than your body burns (through exercise and other physical activity). Eat more fruits and vegetables  · Eat a variety of fruit and vegetables every day. Dark green, deep orange, red, or yellow fruits and vegetables are especially good for you. Examples include spinach, carrots, peaches, and berries. · Keep carrots, celery, and other veggies handy for snacks. Buy fruit that is in season and store it where you can see it so that you will be tempted to eat it. · Cook dishes that have a lot of veggies in them, such as stir-fries and soups.   Limit saturated and trans fat  · Read food labels, and try to avoid saturated and trans fats. They increase your risk of heart disease. Trans fat is found in many processed foods such as cookies and crackers. · Use olive or canola oil when you cook. Try cholesterol-lowering spreads, such as Benecol or Take Control. · Bake, broil, grill, or steam foods instead of frying them. · Choose lean meats instead of high-fat meats such as hot dogs and sausages. Cut off all visible fat when you prepare meat. · Eat fish, skinless poultry, and meat alternatives such as soy products instead of high-fat meats. Soy products, such as tofu, may be especially good for your heart. · Choose low-fat or fat-free milk and dairy products. Eat fish  · Eat at least two servings of fish a week. Certain fish, such as salmon and tuna, contain omega-3 fatty acids, which may help reduce your risk of heart attack. Eat foods high in fiber  · Eat a variety of grain products every day. Include whole-grain foods that have lots of fiber and nutrients. Examples of whole-grain foods include oats, whole wheat bread, and brown rice. · Buy whole-grain breads and cereals, instead of white bread or pastries. Limit salt and sodium  · Limit how much salt and sodium you eat to help lower your blood pressure. · Taste food before you salt it. Add only a little salt when you think you need it. With time, your taste buds will adjust to less salt. · Eat fewer snack items, fast foods, and other high-salt, processed foods. Check food labels for the amount of sodium in packaged foods. · Choose low-sodium versions of canned goods (such as soups, vegetables, and beans). Limit sugar  · Limit drinks and foods with added sugar. These include candy, desserts, and soda pop. Limit alcohol  · Limit alcohol to no more than 2 drinks a day for men and 1 drink a day for women. Too much alcohol can cause health problems. When should you call for help?   Watch closely for changes in your health, and be sure to contact your doctor if:    · You would like help planning heart-healthy meals. Where can you learn more? Go to http://jacquie-hiram.info/. Enter V137 in the search box to learn more about \"Heart-Healthy Diet: Care Instructions. \"  Current as of: 2019  Content Version: 12.2  © 1165-0277 [employer]. Care instructions adapted under license by [employer] (which disclaims liability or warranty for this information). If you have questions about a medical condition or this instruction, always ask your healthcare professional. [address] any warranty or liability for your use of this information. [employer] Activation    Thank you for requesting access to [employer]. Please follow the instructions below to securely access and download your online medical record. [employer] allows you to send messages to your doctor, view your test results, renew your prescriptions, schedule appointments, and more. How Do I Sign Up? 1. In your internet browser, go to [email or web address]. 2. Click on the First Time User? Click Here link in the Sign In box. You will see the New Member Sign Up page. 3. Enter your [employer] Access Code exactly as it appears below. You will not need to use this code after youve completed the sign-up process. If you do not sign up before the expiration date, you must request a new code. [employer] Access Code: [email or web address]  Expires: 2019  9:18 AM (This is the date your [employer] access code will )    4. Enter the last four digits of your Social Security Number (xxxx) and Date of Birth (mm/dd/yyyy) as indicated and click Submit. You will be taken to the next sign-up page. 5. Create a [employer] ID. This will be your [employer] login ID and cannot be changed, so think of one that is secure and easy to remember. 6. Create a [employer] password.  You can change your password at any time. 7. Enter your Password Reset Question and Answer. This can be used at a later time if you forget your password. 8. Enter your e-mail address. You will receive e-mail notification when new information is available in [address] 9. Click Sign Up. You can now view and download portions of your medical record. 10. Click the Download Summary menu link to download a portable copy of your medical information. Additional Information    If you have questions, please visit the Frequently Asked Questions section of the [employer] website at [email or web address] Remember, [employer] is NOT to be used for urgent needs. For medical emergencies, dial 911.

## 2020-05-12 ENCOUNTER — VIRTUAL VISIT (OUTPATIENT)
Dept: CARDIOLOGY CLINIC | Age: 85
End: 2020-05-12

## 2020-05-12 VITALS — WEIGHT: 174 LBS | BODY MASS INDEX: 25.77 KG/M2 | HEIGHT: 69 IN

## 2020-05-12 DIAGNOSIS — I25.119 CORONARY ARTERY DISEASE INVOLVING NATIVE CORONARY ARTERY OF NATIVE HEART WITH ANGINA PECTORIS (HCC): Primary | ICD-10-CM

## 2020-05-12 DIAGNOSIS — I42.9 CARDIOMYOPATHY, UNSPECIFIED TYPE (HCC): ICD-10-CM

## 2020-05-12 DIAGNOSIS — Z95.5 S/P CORONARY ARTERY STENT PLACEMENT: ICD-10-CM

## 2020-05-12 DIAGNOSIS — I50.42 CHRONIC COMBINED SYSTOLIC AND DIASTOLIC HEART FAILURE (HCC): ICD-10-CM

## 2020-05-12 DIAGNOSIS — I25.2 OLD MI (MYOCARDIAL INFARCTION): ICD-10-CM

## 2020-05-12 RX ORDER — LANOLIN ALCOHOL/MO/W.PET/CERES
CREAM (GRAM) TOPICAL
COMMUNITY

## 2020-05-12 RX ORDER — CALCIUM CARBONATE 500(1250)
TABLET ORAL DAILY
COMMUNITY

## 2020-05-12 NOTE — PROGRESS NOTES
Consent: Dolores Hawkins, who was seen by synchronous (real-time) audio-video technology, and/or his healthcare decision maker, is aware that this patient-initiated, Telehealth encounter on 5/12/2020 is a billable service, with coverage as determined by his insurance carrier. He is aware that he may receive a bill and has provided verbal consent to proceed: Yes. Subjective:   Dolores Hawkins is a 80 y.o. male who was seen for Coronary Artery Disease (6 month)    Patient seen today for virtual visit. Patient had hospital admissions since last visit. In January 2020 patient had urosepsis and cholecystitis. He was managed nonsurgically. He is slowly recovered. Family History   Problem Relation Age of Onset    Stroke Father     Cancer Sister     Cancer Brother     Heart Attack Neg Hx     Heart Surgery Neg Hx      Past Surgical History:   Procedure Laterality Date    HX HEART CATHETERIZATION      4/13/3 vessel cad    HX PTCA       Allergies   Allergen Reactions    Augmentin [Amoxicillin-Pot Clavulanate] Diarrhea and Other (comments)     Pt denies allergy       Current Outpatient Medications:     calcium carbonate (OS-NICOLAS) 500 mg calcium (1,250 mg) tablet, Take  by mouth daily. , Disp: , Rfl:     multivit-min/FA/lycopen/lutein (CENTRUM SILVER ULTRA MEN'S PO), Take  by mouth., Disp: , Rfl:     ferrous sulfate (Iron) 325 mg (65 mg iron) tablet, Take  by mouth Daily (before breakfast). , Disp: , Rfl:     amLODIPine (NORVASC) 5 mg tablet, Take 1 Tab by mouth daily. , Disp: 90 Tab, Rfl: 2    Blood-Glucose Meter misc, 1 Each by SubCUTAneous route., Disp: , Rfl:     ACCU-CHEK SMARTVIEW TEST STRIP strip, INJECT BENEATH THE SKIN AND USE 1 STRIP AS DIRECTED DAILY, Disp: , Rfl: 1    glucose blood VI test strips (ASCENSIA AUTODISC VI, ONE TOUCH ULTRA TEST VI) strip, CHECK BLOOD SUGAR ONCE DAILY and prn, Disp: , Rfl:     Insulin Needles, Disposable, (GAGE PEN NEEDLE) 32 gauge x 5/32\" ndle, USE 1 NEEDLE BENEATH THE SKIN THREE TIMES A DAY WITH MEALS  E11, Disp: , Rfl:     finasteride (PROSCAR) 5 mg tablet, Take 1 Tab by mouth daily. , Disp: 90 Tab, Rfl: 3    polyethylene glycol (MIRALAX) 17 gram packet, Take 17 g by mouth daily. , Disp: , Rfl:     simethicone (GAS-X) 125 mg capsule, Take 125 mg by mouth four (4) times daily as needed for Flatulence. , Disp: , Rfl:     aspirin 81 mg chewable tablet, Take 81 mg by mouth daily. , Disp: , Rfl:     Omeprazole delayed release (PRILOSEC D/R) 20 mg tablet, Take 20 mg by mouth daily. , Disp: , Rfl:     acetaminophen (TYLENOL) 500 mg tablet, Take  by mouth every six (6) hours as needed for Pain., Disp: , Rfl:     allopurinol (ZYLOPRIM) 100 mg tablet, Take  by mouth two (2) times a day., Disp: , Rfl:     gabapentin (NEURONTIN) 300 mg capsule, Take 300 mg by mouth daily. , Disp: , Rfl:     insulin glargine (LANTUS) 100 unit/mL injection, by SubCUTAneous route nightly., Disp: , Rfl:     carvedilol (COREG) 12.5 mg tablet, Take 1 Tab by mouth two (2) times daily (with meals). (Patient taking differently: Take 3.125 mg by mouth two (2) times daily (with meals). ), Disp: 60 Tab, Rfl: 6    atorvastatin (LIPITOR) 80 mg tablet, Take 1 Tab by mouth daily. , Disp: 30 Tab, Rfl: 6    furosemide (LASIX) 40 mg tablet, Take 1 Tab by mouth daily. (Patient taking differently: Take 40 mg by mouth. 1/2 tab every 2 days), Disp: 30 Tab, Rfl: 6    memantine (NAMENDA) 10 mg tablet, Take  by mouth two (2) times a day., Disp: , Rfl:   Allergies   Allergen Reactions    Augmentin [Amoxicillin-Pot Clavulanate] Diarrhea and Other (comments)     Pt denies allergy         Review of Systems   Review of Systems   Constitutional: Negative for chills and fever. HENT: Negative for nosebleeds. Eyes: Negative for blurred vision and double vision. Respiratory: See HPI   Cardiovascular: See HPI  Gastrointestinal: Negative for abdominal pain, heartburn, nausea and vomiting.    Musculoskeletal: Negative for myalgias. Skin: Negative for rash. Neurological: Negative for dizziness, weakness and headaches. Endo/Heme/Allergies: Does not bruise/bleed easily. Objective:     Visit Vitals  Ht 5' 9\" (1.753 m)   Wt 78.9 kg (174 lb)   BMI 25.70 kg/m²      General: alert, cooperative, no distress   Mental  status: normal mood, behavior, speech, dress, motor activity, and thought processes, able to follow commands   HENT: NCAT   Neck: no visualized mass,No JVD   Resp: no respiratory distress   Neuro: no gross deficits   Skin: no discoloration or Bruise on visible areas   Psychiatric: normal affect, consistent with stated mood, no evidence of hallucinations     Additional exam findings:     Extremities:has edema     Pertinent LAB and reports  I have reviewed available  pertinent notes, labs and reports and included in current evaluation and management of this patient. Assessment & Plan:   Diagnoses and all orders for this visit:    1. Coronary artery disease involving native coronary artery of native heart with angina pectoris ([facility] [address]  Comments:  Stable continue treatment    2. Old MI (myocardial infarction)  Comments:  Stable    3. S/P coronary artery stent placement  Comments:  Stable    4. Chronic combined systolic and diastolic heart failure (HCC)  Comments:  Continue treatment monitor    5. Cardiomyopathy, unspecified type [facility])  Comments:  Continue treatment      5/2020  Follow-up and Dispositions    · Return in about 6 months (around 11/12/2020). Cardiac status stable continue current medical management. Not on ACE inhibitor  due to CKD. Continue current medical management      712  We discussed the expected course, resolution and complications of the diagnosis(es) in detail. Medication risks, benefits, costs, interactions, and alternatives were discussed as indicated. I advised him to contact the office if his condition worsens, changes or fails to improve as anticipated.  He expressed understanding with the diagnosis(es) and plan. Keesha Camacho is a 80 y.o. male being evaluated by a video visit encounter for concerns as above. A caregiver was present when appropriate. Due to this being a TeleHealth encounter (During PGWDJ-25 public health emergency), evaluation of the following organ systems was limited: Vitals/Constitutional/EENT/Resp/CV/GI//MS/Neuro/Skin/Heme-Lymph-Imm. Pursuant to the emergency declaration under the [address] [place] [address], [facility] waiver authority and the [employer] Act, this Virtual  Visit was conducted, with patient's (and/or legal guardian's) consent, to reduce the patient's risk of exposure to COVID-19 and provide necessary medical care. Services were provided through a video synchronous discussion virtually to substitute for in-person clinic visit. I was at home while conducting this encounter.         Concetta Osorio MD

## 2020-05-12 NOTE — PATIENT INSTRUCTIONS
[employer] Activation Thank you for requesting access to [employer]. Please follow the instructions below to securely access and download your online medical record. [employer] allows you to send messages to your doctor, view your test results, renew your prescriptions, schedule appointments, and more. How Do I Sign Up? 1. In your internet browser, go to [email or web address]. 2. Click on the First Time User? Click Here link in the Sign In box. You will see the New Member Sign Up page. 3. Enter your [employer] Access Code exactly as it appears below. You will not need to use this code after youve completed the sign-up process. If you do not sign up before the expiration date, you must request a new code. [employer] Access Code: [email or web address] Expires: 2020  9:33 AM (This is the date your [employer] access code will ) 4. Enter the last four digits of your Social Security Number (xxxx) and Date of Birth (mm/dd/yyyy) as indicated and click Submit. You will be taken to the next sign-up page. 5. Create a [employer] ID. This will be your [employer] login ID and cannot be changed, so think of one that is secure and easy to remember. 6. Create a [employer] password. You can change your password at any time. 7. Enter your Password Reset Question and Answer. This can be used at a later time if you forget your password. 8. Enter your e-mail address. You will receive e-mail notification when new information is available in [address] 9. Click Sign Up. You can now view and download portions of your medical record. 10. Click the Download Summary menu link to download a portable copy of your medical information. Additional Information If you have questions, please visit the Frequently Asked Questions section of the [employer] website at [email or web address] Remember, [employer] is NOT to be used for urgent needs. For medical emergencies, dial 911.

## 2020-05-12 NOTE — PROGRESS NOTES
1. Have you been to the ER, urgent care clinic since your last visit? Hospitalized since your last visit?     no  2. Have you seen or consulted any other health care providers outside of the [address] [place] [address] since your last visit? Include any pap smears or colon screening. No     3. Since your last visit, have you had any of the following symptoms?      swelling in legs/arms.

## 2020-05-12 NOTE — LETTER
Ruchi Sender 9/4/1927 5/12/2020 Dear Talat Cano NP I had the pleasure of evaluating  Mr. Cuca Gillespie in office today. Below are the relevant portions of my assessment and plan of care. ICD-10-CM ICD-9-CM 1. Coronary artery disease involving native coronary artery of native heart with angina pectoris ([facility] [address] I25.119 414.01   
  413.9 Stable continue treatment 2. Old MI (myocardial infarction) I25.2 412 Stable 3. S/P coronary artery stent placement Z95.5 V45.82 Stable 4. Chronic combined systolic and diastolic heart failure (HCC) I50.42 428.42 Continue treatment monitor 5. Cardiomyopathy, unspecified type ([facility] [address] I42.9 425.4 Continue treatment Current Outpatient Medications Medication Sig Dispense Refill  calcium carbonate (OS-NICOLAS) 500 mg calcium (1,250 mg) tablet Take  by mouth daily.  multivit-min/FA/lycopen/lutein (CENTRUM SILVER ULTRA MEN'S PO) Take  by mouth.  ferrous sulfate (Iron) 325 mg (65 mg iron) tablet Take  by mouth Daily (before breakfast).  amLODIPine (NORVASC) 5 mg tablet Take 1 Tab by mouth daily. 90 Tab 2  Blood-Glucose Meter misc 1 Each by SubCUTAneous route.  ACCU-CHEK SMARTVIEW TEST STRIP strip INJECT BENEATH THE SKIN AND USE 1 STRIP AS DIRECTED DAILY  1  
 glucose blood VI test strips (ASCENSIA AUTODISC VI, ONE TOUCH ULTRA TEST VI) strip CHECK BLOOD SUGAR ONCE DAILY and prn  Insulin Needles, Disposable, (GAGE PEN NEEDLE) 32 gauge x 5/32\" ndle USE 1 NEEDLE BENEATH THE SKIN THREE TIMES A DAY WITH MEALS 
 
E11    
 finasteride (PROSCAR) 5 mg tablet Take 1 Tab by mouth daily. 90 Tab 3  polyethylene glycol (MIRALAX) 17 gram packet Take 17 g by mouth daily.  simethicone (GAS-X) 125 mg capsule Take 125 mg by mouth four (4) times daily as needed for Flatulence.  aspirin 81 mg chewable tablet Take 81 mg by mouth daily.     
 Omeprazole delayed release (PRILOSEC D/R) 20 mg tablet Take 20 mg by mouth daily.  acetaminophen (TYLENOL) 500 mg tablet Take  by mouth every six (6) hours as needed for Pain.  allopurinol (ZYLOPRIM) 100 mg tablet Take  by mouth two (2) times a day.  gabapentin (NEURONTIN) 300 mg capsule Take 300 mg by mouth daily.  insulin glargine (LANTUS) 100 unit/mL injection by SubCUTAneous route nightly.  carvedilol (COREG) 12.5 mg tablet Take 1 Tab by mouth two (2) times daily (with meals). (Patient taking differently: Take 3.125 mg by mouth two (2) times daily (with meals). ) 60 Tab 6  
 atorvastatin (LIPITOR) 80 mg tablet Take 1 Tab by mouth daily. 30 Tab 6  furosemide (LASIX) 40 mg tablet Take 1 Tab by mouth daily. (Patient taking differently: Take 40 mg by mouth. 1/2 tab every 2 days) 30 Tab 6  
 memantine (NAMENDA) 10 mg tablet Take  by mouth two (2) times a day. Orders Placed This Encounter  calcium carbonate (OS-NICOLAS) 500 mg calcium (1,250 mg) tablet Sig: Take  by mouth daily.  multivit-min/FA/lycopen/lutein (CENTRUM SILVER ULTRA MEN'S PO) Sig: Take  by mouth.  ferrous sulfate (Iron) 325 mg (65 mg iron) tablet Sig: Take  by mouth Daily (before breakfast). If you have questions, please do not hesitate to call me. I look forward to following Mr. Sunday Fothergill along with you. Sincerely, Orlando Flores MD

## 2020-12-10 DIAGNOSIS — I10 ESSENTIAL HYPERTENSION: ICD-10-CM

## 2020-12-10 RX ORDER — AMLODIPINE BESYLATE 5 MG/1
TABLET ORAL
Qty: 90 TAB | Refills: 2 | Status: SHIPPED | OUTPATIENT
Start: 2020-12-10

## 2021-01-22 ENCOUNTER — HOSPITAL ENCOUNTER (OUTPATIENT)
Dept: LAB | Age: 86
Discharge: HOME | End: 2021-01-22
Payer: MEDICARE

## 2021-01-22 LAB
ALBUMIN SERPL-MCNC: 1.8 G/DL (ref 3.5–4.7)
ALBUMIN/GLOB SERPL: 0.3 {RATIO}
ALP SERPL-CCNC: 78 U/L (ref 38–126)
ALT SERPL-CCNC: 28 U/L (ref 3–72)
ANION GAP SERPL CALC-SCNC: 14 MMOL/L
AST SERPL W P-5'-P-CCNC: 82 U/L (ref 17–74)
BASOPHILS # BLD: 0 K/UL
BASOPHILS NFR BLD: 0 %
BILIRUB SERPL-MCNC: 0.9 MG/DL (ref 0.2–1)
BUN SERPL-MCNC: 129 MG/DL (ref 9–21)
BUN/CREAT SERPL: 23
CA-I BLD-MCNC: 8.1 MG/DL (ref 8.5–10.5)
CHLORIDE SERPL-SCNC: 102 MMOL/L (ref 94–111)
CO2 SERPL-SCNC: 20 MMOL/L (ref 21–33)
CREAT SERPL-MCNC: 5.7 MG/DL (ref 0.8–1.5)
EOSINOPHIL # BLD: 0 K/UL
EOSINOPHIL NFR BLD: 0 %
ERYTHROCYTE [DISTWIDTH] IN BLOOD BY AUTOMATED COUNT: 17.5 % (ref 11.6–14.5)
GLOBULIN SER CALC-MCNC: 6.4 G/DL
GLUCOSE SERPL-MCNC: 130 MG/DL (ref 70–110)
HCT VFR BLD AUTO: 25.4 % (ref 36–48)
HGB BLD-MCNC: 8.2 G/DL (ref 13–16)
IMM GRANULOCYTES # BLD AUTO: 0 K/UL
IMM GRANULOCYTES NFR BLD AUTO: 0 %
LYMPHOCYTES # BLD: 1.5 K/UL
LYMPHOCYTES NFR BLD: 12 %
MCH RBC QN AUTO: 22.7 PG (ref 24–34)
MCHC RBC AUTO-ENTMCNC: 32.3 G/DL (ref 31–37)
MCV RBC AUTO: 70.4 FL (ref 74–97)
MONOCYTES # BLD: 0.7 K/UL
MONOCYTES NFR BLD: 6 %
NEUTS SEG # BLD: 9.9 K/UL
NEUTS SEG NFR BLD: 82 %
PLATELET # BLD AUTO: 214 K/UL (ref 135–420)
POTASSIUM SERPL-SCNC: 3.6 MMOL/L (ref 3.2–5.1)
PROT SERPL-MCNC: 8.2 G/DL (ref 6.1–8.4)
RBC # BLD AUTO: 3.61 M/UL (ref 4.7–5.5)
RBC MORPH BLD: ABNORMAL
SODIUM SERPL-SCNC: 136 MMOL/L (ref 135–145)
WBC # BLD AUTO: 12.1 K/UL (ref 4.6–13.2)

## 2021-01-22 PROCEDURE — 83540 ASSAY OF IRON: CPT

## 2021-01-22 PROCEDURE — 85025 COMPLETE CBC W/AUTO DIFF WBC: CPT

## 2021-01-22 PROCEDURE — 82728 ASSAY OF FERRITIN: CPT

## 2021-01-22 PROCEDURE — 36415 COLL VENOUS BLD VENIPUNCTURE: CPT

## 2021-01-22 PROCEDURE — 82607 VITAMIN B-12: CPT

## 2021-01-22 PROCEDURE — 83036 HEMOGLOBIN GLYCOSYLATED A1C: CPT

## 2021-01-22 PROCEDURE — 80061 LIPID PANEL: CPT

## 2021-01-22 PROCEDURE — 82306 VITAMIN D 25 HYDROXY: CPT

## 2021-01-22 PROCEDURE — 80053 COMPREHEN METABOLIC PANEL: CPT

## 2021-01-23 LAB
25(OH)D3 SERPL-MCNC: 30.6 NG/ML (ref 30–100)
CHOLEST SERPL-MCNC: <50 MG/DL
EST. AVERAGE GLUCOSE BLD GHB EST-MCNC: 160 MG/DL
FERRITIN SERPL-MCNC: 696 NG/ML (ref 26–388)
FOLATE SERPL-MCNC: 11.2 NG/ML (ref 5–21)
HBA1C MFR BLD: 7.2 % (ref 4–5.6)
HDLC SERPL-MCNC: 11 MG/DL
HDLC SERPL: NORMAL {RATIO} (ref 0–5)
IRON SATN MFR SERPL: 14 % (ref 20–50)
IRON SERPL-MCNC: 24 UG/DL (ref 35–150)
LDLC SERPL CALC-MCNC: NORMAL MG/DL (ref 0–100)
LIPID PROFILE,FLP: NORMAL
TIBC SERPL-MCNC: 175 UG/DL (ref 250–450)
TRIGL SERPL-MCNC: 132 MG/DL (ref ?–150)
VIT B12 SERPL-MCNC: 1231 PG/ML (ref 193–986)
VLDLC SERPL CALC-MCNC: NORMAL MG/DL